# Patient Record
Sex: FEMALE | Race: ASIAN | NOT HISPANIC OR LATINO | Employment: UNEMPLOYED | URBAN - METROPOLITAN AREA
[De-identification: names, ages, dates, MRNs, and addresses within clinical notes are randomized per-mention and may not be internally consistent; named-entity substitution may affect disease eponyms.]

---

## 2017-06-19 ENCOUNTER — OFFICE VISIT (OUTPATIENT)
Dept: FAMILY MEDICINE | Facility: HOSPITAL | Age: 27
End: 2017-06-19
Payer: MEDICAID

## 2017-06-19 VITALS
HEIGHT: 65 IN | SYSTOLIC BLOOD PRESSURE: 98 MMHG | DIASTOLIC BLOOD PRESSURE: 63 MMHG | BODY MASS INDEX: 23.66 KG/M2 | HEART RATE: 75 BPM | WEIGHT: 142 LBS

## 2017-06-19 DIAGNOSIS — R53.83 FATIGUE, UNSPECIFIED TYPE: ICD-10-CM

## 2017-06-19 DIAGNOSIS — Z00.00 ANNUAL PHYSICAL EXAM: Primary | ICD-10-CM

## 2017-06-19 DIAGNOSIS — K74.00 LIVER FIBROSIS: ICD-10-CM

## 2017-06-19 DIAGNOSIS — K74.3 PRIMARY BILIARY CIRRHOSIS: ICD-10-CM

## 2017-06-19 DIAGNOSIS — J30.2 SEASONAL ALLERGIC RHINITIS, UNSPECIFIED ALLERGIC RHINITIS TRIGGER: ICD-10-CM

## 2017-06-19 DIAGNOSIS — L65.9 HAIR LOSS: ICD-10-CM

## 2017-06-19 PROCEDURE — 99203 OFFICE O/P NEW LOW 30 MIN: CPT | Performed by: FAMILY MEDICINE

## 2017-06-19 RX ORDER — NAPROXEN SODIUM 550 MG/1
TABLET ORAL
Refills: 3 | COMMUNITY
Start: 2017-04-14 | End: 2018-01-03

## 2017-06-19 RX ORDER — LORATADINE 10 MG/1
10 TABLET ORAL DAILY
Refills: 0 | COMMUNITY
Start: 2017-06-19 | End: 2018-04-24

## 2017-06-19 NOTE — PROGRESS NOTES
Subjective:       Patient ID: Karine Lancedwy is a 27 y.o. female.    Chief Complaint: Establish Care;  Extremity Weakness; and Dizziness    HPI   Patient is a 27 year old female with a history of grade 1 focal liver fibrosis found on biopsy, and +antimitochondrial antibodies and thrombocytopenia presenting to clinic to establish care and complains of weakness/fatigue, hair loss.  Patient states she is currently being followed by GI at [facility]-chart reviewed this visit.  No interventions at this time, continued monitoring of liver per imaging and lab recommended.  Patient reports for the past 2 years she has noticed increased lethargy and fatigue.  Also reports a 20 lb weight loss which was intentional and induced by dietary and exercise modifications.  She also states increased hair loss for the same amount of time.  Has a strong family history of breast cancer in her maternal aunt and mother (bilateral masectomy, chemo and hormonal therapy in mother as of now, maternal aunt passed of breast cancer mets to brain).    Review of Systems   Constitutional: Positive for fatigue. Negative for diaphoresis and fever.        Weakness   HENT: Negative for congestion.    Eyes: Negative for discharge and visual disturbance.   Respiratory: Negative for cough, shortness of breath and wheezing.    Cardiovascular: Negative for chest pain, palpitations and leg swelling.   Gastrointestinal: Negative for abdominal distention, abdominal pain, constipation, diarrhea, nausea and vomiting.   Endocrine: Negative for cold intolerance and heat intolerance.   Genitourinary: Negative for dysuria and flank pain.   Musculoskeletal: Negative for arthralgias and myalgias.   Skin: Negative for color change, pallor and rash.        Hair loss   Neurological: Negative for weakness, numbness and headaches.   Hematological: Negative for adenopathy.   Psychiatric/Behavioral: Negative for agitation, behavioral problems and hallucinations. The patient is  not nervous/anxious.          Objective:      Vitals:    06/19/17 1059   BP: 98/63   Pulse: 75     Physical Exam   Constitutional: She is oriented to person, place, and time. She appears well-developed and well-nourished.   HENT:   Head: Normocephalic and atraumatic.   Eyes: EOM are normal. Pupils are equal, round, and reactive to light.   Neck: Normal range of motion.   Cardiovascular: Normal rate, regular rhythm and normal heart sounds.  Exam reveals no gallop and no friction rub.    No murmur heard.  Pulmonary/Chest: Effort normal and breath sounds normal. No respiratory distress. She has no wheezes.   Abdominal: Soft. Bowel sounds are normal. She exhibits no distension. There is no tenderness.   Musculoskeletal: Normal range of motion. She exhibits no edema.   Neurological: She is alert and oriented to person, place, and time.   Psychiatric: She has a normal mood and affect. Her behavior is normal. Thought content normal.       Assessment:       1. Annual physical exam    2. Liver fibrosis    3. Primary biliary cirrhosis    4. Fatigue, unspecified type    5. Hair loss    6. Seasonal allergic rhinitis, unspecified allergic rhinitis trigger        Plan:       Annual physical exam  -     Comprehensive metabolic panel; Future; Expected date: 06/19/2017  -     Hemoglobin A1c; Future; Expected date: 06/19/2017    Liver fibrosis  Primary biliary cirrhosis        -    Followed by GI at [facility]        -    Counseled patient on hepatotoxic agents and foods-verbalized understanding.    Fatigue, unspecified type  -     CBC auto differential; Future; Expected date: 06/19/2017  -     TSH; Future; Expected date: 06/19/2017  -     Vitamin D; Future; Expected date: 06/19/2017    Hair loss  -     TSH; Future; Expected date: 06/19/2017    Seasonal allergic rhinitis, unspecified allergic rhinitis trigger  -     loratadine (CLARITIN) 10 mg tablet; Take 1 tablet (10 mg total) by mouth once daily.; Refill: 0    Health Maintenance and  screening       -    PAP up to date       -    Strong family history of breast cancer (see HPI).  Screening recommendations discussed with patient this visit.    Return in about 1 month (around 7/19/2017).      Patient discussed with staff.    Nina Hidalgo MD  [facility],  2  6/19/2017  1:11 PM

## 2017-06-20 DIAGNOSIS — E55.9 VITAMIN D DEFICIENCY: Primary | ICD-10-CM

## 2017-06-20 RX ORDER — VIT C/E/ZN/COPPR/LUTEIN/ZEAXAN 250MG-90MG
1000 CAPSULE ORAL DAILY
Refills: 0 | COMMUNITY
Start: 2017-06-20 | End: 2017-07-17 | Stop reason: SDUPTHER

## 2017-07-17 ENCOUNTER — OFFICE VISIT (OUTPATIENT)
Dept: FAMILY MEDICINE | Facility: HOSPITAL | Age: 27
End: 2017-07-17
Attending: FAMILY MEDICINE
Payer: MEDICAID

## 2017-07-17 VITALS
DIASTOLIC BLOOD PRESSURE: 56 MMHG | WEIGHT: 142.63 LBS | SYSTOLIC BLOOD PRESSURE: 92 MMHG | HEIGHT: 66 IN | BODY MASS INDEX: 22.92 KG/M2 | HEART RATE: 76 BPM

## 2017-07-17 DIAGNOSIS — E55.9 VITAMIN D DEFICIENCY: ICD-10-CM

## 2017-07-17 PROCEDURE — 99213 OFFICE O/P EST LOW 20 MIN: CPT | Performed by: FAMILY MEDICINE

## 2017-07-17 RX ORDER — FOLIC ACID 0.4 MG
400 TABLET ORAL DAILY
Refills: 0 | COMMUNITY
Start: 2017-07-17 | End: 2024-03-11

## 2017-07-17 RX ORDER — VIT C/E/ZN/COPPR/LUTEIN/ZEAXAN 250MG-90MG
1000 CAPSULE ORAL DAILY
Refills: 0 | COMMUNITY
Start: 2017-07-17 | End: 2018-04-24

## 2017-07-17 NOTE — PROGRESS NOTES
Subjective:       Patient ID: Karine Lancedwy is a 27 y.o. female.    Chief Complaint: Follow-up    HPI     27 year old female with possible primary sclerosing cholangitis came in for a wellness check and labs review, primary fibrosis of liver and +anti mitochondrial antibodies presenting to clinic for lab results and follow up.  Patient was noted to have low vitamin D and supplementation was sent to the pharmacy.  Patient stated she was unable to start supplements as of now.  She was also recently seen by GI at [facility].  Platelets noted downtown labs to be 103, our labs reflected 130 4 weeks ago.  Per GI monitor in on year, no interventions at this time.  Patient continues to complain of fatigue/feelings of tiredness.  Encouraged patient to start Vitamin D supplementation.    Review of Systems   Constitutional: Positive for fatigue. Negative for diaphoresis and fever.   HENT: Negative for congestion.    Eyes: Negative for discharge and visual disturbance.   Respiratory: Negative for cough, shortness of breath and wheezing.    Cardiovascular: Negative for chest pain, palpitations and leg swelling.   Gastrointestinal: Negative for abdominal distention, abdominal pain, constipation, diarrhea, nausea and vomiting.   Endocrine: Negative for cold intolerance and heat intolerance.   Genitourinary: Negative for dysuria and flank pain.   Musculoskeletal: Negative for arthralgias and myalgias.   Skin: Negative for color change, pallor and rash.   Neurological: Negative for weakness, numbness and headaches.   Hematological: Negative for adenopathy.   Psychiatric/Behavioral: Negative for agitation, behavioral problems and hallucinations. The patient is not nervous/anxious.        Objective:      Vitals:    07/17/17 1355   BP: (!) 92/56   Pulse: 76     Physical Exam   Constitutional: She is oriented to person, place, and time. She appears well-developed and well-nourished.   HENT:   Head: Normocephalic and atraumatic.   Eyes: EOM  are normal. Pupils are equal, round, and reactive to light.   Neck: Normal range of motion.   Cardiovascular: Normal rate, regular rhythm and normal heart sounds.  Exam reveals no gallop and no friction rub.    No murmur heard.  Pulmonary/Chest: Effort normal and breath sounds normal. No respiratory distress. She has no wheezes.   Abdominal: Soft. Bowel sounds are normal. She exhibits no distension. There is no tenderness.   Musculoskeletal: Normal range of motion. She exhibits no edema.   Neurological: She is alert and oriented to person, place, and time.   Psychiatric: She has a normal mood and affect. Her behavior is normal. Thought content normal.       Assessment:       1. Vitamin D deficiency        Plan:       Health Maintenace and Screening        -     PAP uptodate, Folic acid daily    History of liver fibrosis  Thrombocytopenia        -    Followed by A[facility]    Vitamin D deficiency  -     cholecalciferol, vitamin D3, 1,000 unit capsule; Take 1 capsule (1,000 Units total) by mouth once daily.; Refill: 0    Return in about 1 year (around 7/17/2018).      Patient discussed with staff.    Nina Hidalgo MD  [facility],  3  7/17/2017  2:46 PM

## 2017-07-18 NOTE — PROGRESS NOTES
I assume primary medical responsibility for this patient, I have reviewed the case history, findings, diagnosis and treatment plan with the resident and agree that the care is reasonable and necessary. This service has been performed by a resident without the presence of a teaching physician under the primary care exception  Patience Jaime  7/18/2017

## 2017-08-16 ENCOUNTER — OFFICE VISIT (OUTPATIENT)
Dept: FAMILY MEDICINE | Facility: HOSPITAL | Age: 27
End: 2017-08-16
Attending: FAMILY MEDICINE
Payer: MEDICAID

## 2017-08-16 VITALS
WEIGHT: 141.31 LBS | HEART RATE: 87 BPM | DIASTOLIC BLOOD PRESSURE: 54 MMHG | HEIGHT: 64 IN | SYSTOLIC BLOOD PRESSURE: 116 MMHG | BODY MASS INDEX: 24.13 KG/M2

## 2017-08-16 DIAGNOSIS — H00.12 CHALAZION OF RIGHT LOWER EYELID: ICD-10-CM

## 2017-08-16 PROCEDURE — 99213 OFFICE O/P EST LOW 20 MIN: CPT | Performed by: FAMILY MEDICINE

## 2017-08-16 NOTE — PROGRESS NOTES
Subjective:       Patient ID: Karine Lancedwy is a 27 y.o. female.    Chief Complaint: Eye Pain    Pt presents for checkup due to eye pain. The patient states for the past week she has had swelling in the lower eyelid of her right eye. She denies any fever, chills, redness, or drainage. Her father is an eye doctor in [place] who examined her last week and instructed her to perform warm compresses. The patient states since that time the swelling has improved but has concerns about what to do if the swelling returns. Otherwise the patient has been doing well and follows with GI at [facility].      Review of Systems   Constitutional: Negative for chills and fever.   HENT: Negative for sore throat.    Eyes: Negative for visual disturbance.        Minimal swelling right eye lower eyelid,   Respiratory: Negative for shortness of breath.    Cardiovascular: Negative for chest pain.   Gastrointestinal: Negative for abdominal pain.   Endocrine: Negative for polyuria.   Genitourinary: Negative for dysuria.   Musculoskeletal: Negative for back pain.   Skin: Negative for color change.   Neurological: Negative for headaches.   Psychiatric/Behavioral: Negative for agitation and confusion.       Objective:      Vitals:    08/16/17 1508   BP: (!) 116/54   Pulse: 87     Physical Exam   Constitutional: She is oriented to person, place, and time. She appears well-developed and well-nourished.   HENT:   Head: Normocephalic and atraumatic.   Eyes: EOM are normal. Pupils are equal, round, and reactive to light.   Minimal swelling right lower eyelid  Appearance of chalezeon, no concern for hordeolum   Neck: Normal range of motion. Neck supple.   Cardiovascular: Normal rate, regular rhythm, normal heart sounds and intact distal pulses.    Pulmonary/Chest: Effort normal and breath sounds normal.   Abdominal: Soft. She exhibits no distension.   Musculoskeletal: Normal range of motion.   Neurological: She is alert and oriented to person, place, and  time.   Skin: Skin is warm and dry.   Psychiatric: She has a normal mood and affect. Her behavior is normal. Judgment and thought content normal.   Nursing note and vitals reviewed.      Assessment:       1. Chalazion of right lower eyelid        Plan:       Chalazion of right lower eyelid  - continue warm compresses  - return to clinic swelling worsens and will consider optho eval for surgical removal  - also instructed return clinic if patient develops any pain, redness, drainage, or fever

## 2017-08-17 NOTE — PROGRESS NOTES
I assume primary medical responsibility for this patient, I have reviewed the case history, findings, diagnosis and treatment plan with the resident and agree that the care is reasonable and necessary. This service has been performed by a resident without the presence of a teaching physician under the primary care exception  Patience Jaime  8/17/2017

## 2017-09-13 ENCOUNTER — OFFICE VISIT (OUTPATIENT)
Dept: FAMILY MEDICINE | Facility: HOSPITAL | Age: 27
End: 2017-09-13
Attending: FAMILY MEDICINE
Payer: MEDICAID

## 2017-09-13 VITALS
HEIGHT: 64 IN | BODY MASS INDEX: 24.17 KG/M2 | WEIGHT: 141.56 LBS | DIASTOLIC BLOOD PRESSURE: 62 MMHG | HEART RATE: 81 BPM | SYSTOLIC BLOOD PRESSURE: 105 MMHG

## 2017-09-13 DIAGNOSIS — Z02.0 SCHOOL PHYSICAL EXAM: Primary | ICD-10-CM

## 2017-09-13 PROCEDURE — 99213 OFFICE O/P EST LOW 20 MIN: CPT | Performed by: FAMILY MEDICINE

## 2017-09-13 NOTE — PROGRESS NOTES
Subjective:       Patient ID: Karine TRISTAN Elsaadwmarcela is a 27 y.o. female.    Chief Complaint: Labs Only (titers) and TB test    HPI   Patient presenting to clinic for titers for school.  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  Will need PPD placement as well.  Has no active complaints at this present time.    Review of Systems   Constitutional: Negative for diaphoresis, fatigue and fever.   HENT: Negative for congestion.    Eyes: Negative for discharge and visual disturbance.   Respiratory: Negative for cough, shortness of breath and wheezing.    Cardiovascular: Negative for chest pain, palpitations and leg swelling.   Gastrointestinal: Negative for abdominal distention, abdominal pain, constipation, diarrhea, nausea and vomiting.   Endocrine: Negative for cold intolerance and heat intolerance.   Genitourinary: Negative for dysuria and flank pain.   Musculoskeletal: Negative for arthralgias and myalgias.   Skin: Negative for color change, pallor and rash.   Neurological: Negative for weakness, numbness and headaches.   Hematological: Negative for adenopathy.   Psychiatric/Behavioral: Negative for agitation, behavioral problems and hallucinations. The patient is not nervous/anxious.          Objective:      Vitals:    09/13/17 1459   BP: 105/62   Pulse: 81     Physical Exam   Constitutional: She is oriented to person, place, and time. She appears well-developed and well-nourished.   HENT:   Head: Normocephalic and atraumatic.   Eyes: EOM are normal. Pupils are equal, round, and reactive to light.   Neck: Normal range of motion.   Cardiovascular: Normal rate, regular rhythm and normal heart sounds.  Exam reveals no gallop and no friction rub.    Pulmonary/Chest: Effort normal and breath sounds normal. No respiratory distress. She has no wheezes.   Abdominal: Soft. Bowel sounds are normal. She exhibits no distension. There is no tenderness.   Musculoskeletal: Normal range of motion. She exhibits  no edema.   Neurological: She is alert and oriented to person, place, and time.   Psychiatric: She has a normal mood and affect. Her behavior is normal. Thought content normal.       Assessment:       1. School physical exam        Plan:       School physical exam  -     tuberculin injection 5 Units; Inject 0.1 mLs (5 Units total) into the skin one time.  -     Rubella antibody, IgG; Future; Expected date: 09/13/2017  -     Rubeola antibody IgG; Future; Expected date: 09/13/2017  -     Varicella zoster antibody, IgG; Future; Expected date: 09/13/2017  -     MUMPS ANTIBODY, IGG; Future; Expected date: 09/13/2017    Return in about 2 days (around 9/15/2017) to have PPD read.    Patient discussed with staff.    Nina Hidalgo MD  [facility],  3  9/13/2017  5:37 PM

## 2017-09-13 NOTE — PROGRESS NOTES
PPD applied intradermally to left inner forearm. Patient instructed to return Friday afternoon at 4:15 to have PPD read. Verbalized understanding.

## 2017-09-14 NOTE — PROGRESS NOTES
I assume primary medical responsibility for this patient, I have reviewed the case history, findings, diagnosis and treatment plan with the resident and agree that the care is reasonable and necessary. This service has been performed by a resident without the presence of a teaching physician under the primary care exception  Patience Jaime  9/14/2017

## 2018-01-03 ENCOUNTER — OFFICE VISIT (OUTPATIENT)
Dept: FAMILY MEDICINE | Facility: HOSPITAL | Age: 28
End: 2018-01-03
Attending: FAMILY MEDICINE
Payer: MEDICAID

## 2018-01-03 VITALS
DIASTOLIC BLOOD PRESSURE: 66 MMHG | HEART RATE: 105 BPM | HEIGHT: 66 IN | SYSTOLIC BLOOD PRESSURE: 124 MMHG | WEIGHT: 148.13 LBS | BODY MASS INDEX: 23.81 KG/M2

## 2018-01-03 DIAGNOSIS — J32.9 SINUSITIS, UNSPECIFIED CHRONICITY, UNSPECIFIED LOCATION: Primary | ICD-10-CM

## 2018-01-03 DIAGNOSIS — N92.0 MENORRHAGIA WITH REGULAR CYCLE: ICD-10-CM

## 2018-01-03 PROCEDURE — 99213 OFFICE O/P EST LOW 20 MIN: CPT | Performed by: FAMILY MEDICINE

## 2018-01-03 RX ORDER — FLUTICASONE PROPIONATE 50 MCG
1 SPRAY, SUSPENSION (ML) NASAL DAILY
Qty: 9.9 BOTTLE | Refills: 1 | Status: SHIPPED | OUTPATIENT
Start: 2018-01-03 | End: 2024-02-21

## 2018-01-03 RX ORDER — NAPROXEN 500 MG/1
500 TABLET ORAL 2 TIMES DAILY
Qty: 30 TABLET | Refills: 0 | Status: SHIPPED | OUTPATIENT
Start: 2018-01-03 | End: 2018-04-24 | Stop reason: SDUPTHER

## 2018-01-03 NOTE — PROGRESS NOTES
Subjective:       Patient ID: Karine Hernandez is a 27 y.o. female.    Chief Complaint: Headache    HPI   Patient is a 27 year old female with a history of PBC presenting to clinic headache that has been intermittent since Thanksgiving.  Reports generalized headache with occasional dizziness upon bending forward.  Has taken OTC ibuprofen with minimal relief of headaches.  Denies any vision changes, aura, or alleviating factors.  Denies any nausea/vomiting.     Of note patient also would like to discuss possible removal of Paraguard.  Placed in October of 2016.  States she has a period every 28 days which is heavy and painful. Lasts 7-10 days.  Menses became painful and heavy since having IUD placed.  Currently interested in conceiving with her .     Review of Systems   Constitutional: Negative for diaphoresis, fatigue and fever.   HENT: Negative for congestion.    Eyes: Negative for visual disturbance.   Respiratory: Negative for cough, shortness of breath and wheezing.    Gastrointestinal: Negative for nausea and vomiting.        +menstrual cramping     Endocrine: Negative for cold intolerance and heat intolerance.   Genitourinary: Negative for dysuria and flank pain.        Menstrual problem   Musculoskeletal: Negative for arthralgias.   Skin: Negative for color change, pallor and rash.   Neurological: Positive for headaches. Negative for weakness and numbness.   Hematological: Negative for adenopathy.         Objective:      Vitals:    01/03/18 1609   BP: 124/66   Pulse: 105     Physical Exam   Constitutional: She is oriented to person, place, and time. She appears well-developed and well-nourished.   HENT:   Head: Normocephalic and atraumatic.   Eyes: EOM are normal.   Neck: Normal range of motion.   Cardiovascular: Normal rate and regular rhythm.    Pulmonary/Chest: Effort normal and breath sounds normal.   Abdominal: Soft. Bowel sounds are normal. She exhibits no distension. There is no tenderness.    Musculoskeletal: Normal range of motion. She exhibits no edema.   Neurological: She is alert and oriented to person, place, and time.       Assessment:       1. Sinusitis, unspecified chronicity, unspecified location    2. Menorrhagia with regular cycle        Plan:       Sinusitis, unspecified chronicity, unspecified location  -     fluticasone (FLONASE) 50 mcg/actuation nasal spray; 1 spray by Each Nare route once daily.  Dispense: 9.9 Bottle; Refill: 1    Menorrhagia with regular cycle  -     naproxen (EC NAPROSYN) 500 MG EC tablet; Take 1 tablet (500 mg total) by mouth 2 (two) times daily.  Dispense: 30 tablet; Refill: 0  -    Currently has Paraguard in place, interested in possible removal for conception.    Return in about 1 week (around 1/10/2018) for IUD removal.      Nina Hidalgo MD  [facility],  3  1/3/2018  4:40 PM

## 2018-01-04 NOTE — PROGRESS NOTES
I assume primary medical responsibility for this patient, I have reviewed the case history, findings, diagnosis and treatment plan with the resident and agree that the care is reasonable and necessary. This service has been performed by a resident without the presence of a teaching physician under the primary care exception  Patience Jaime  1/4/2018

## 2018-01-16 ENCOUNTER — OFFICE VISIT (OUTPATIENT)
Dept: FAMILY MEDICINE | Facility: HOSPITAL | Age: 28
End: 2018-01-16
Attending: FAMILY MEDICINE
Payer: MEDICAID

## 2018-01-16 ENCOUNTER — HOSPITAL ENCOUNTER (OUTPATIENT)
Dept: RADIOLOGY | Facility: HOSPITAL | Age: 28
Discharge: HOME | End: 2018-01-16
Attending: STUDENT IN AN ORGANIZED HEALTH CARE EDUCATION/TRAINING PROGRAM
Payer: MEDICAID

## 2018-01-16 VITALS
HEIGHT: 65 IN | SYSTOLIC BLOOD PRESSURE: 117 MMHG | HEART RATE: 77 BPM | WEIGHT: 147.5 LBS | DIASTOLIC BLOOD PRESSURE: 57 MMHG | BODY MASS INDEX: 24.57 KG/M2 | RESPIRATION RATE: 20 BRPM

## 2018-01-16 DIAGNOSIS — S92.532A CLOSED DISPLACED FRACTURE OF DISTAL PHALANX OF LESSER TOE OF LEFT FOOT, INITIAL ENCOUNTER: Primary | ICD-10-CM

## 2018-01-16 DIAGNOSIS — S92.505A CLOSED NONDISPLACED FRACTURE OF PHALANX OF LESSER TOE OF LEFT FOOT, UNSPECIFIED PHALANX, INITIAL ENCOUNTER: ICD-10-CM

## 2018-01-16 PROCEDURE — 99214 OFFICE O/P EST MOD 30 MIN: CPT | Mod: 25 | Performed by: STUDENT IN AN ORGANIZED HEALTH CARE EDUCATION/TRAINING PROGRAM

## 2018-01-16 PROCEDURE — 73660 X-RAY EXAM OF TOE(S): CPT | Mod: 26,LT,, | Performed by: RADIOLOGY

## 2018-01-16 PROCEDURE — 73660 X-RAY EXAM OF TOE(S): CPT | Mod: TC,FY

## 2018-01-17 NOTE — PROGRESS NOTES
Subjective:       Patient ID: Karine Hernandez is a 27 y.o. female.    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 It started around black Friday, she dropped something heavy on it and the other toes recovered but the 4th toe is worsening. She takes 2~3 advil to ease the pain. She also tried jeanie taped without significant help.      Review of Systems   Constitutional: Negative for activity change, appetite change, chills, diaphoresis, fatigue and fever.   HENT: Negative for congestion, hearing loss, sinus pain, sinus pressure and sneezing.    Eyes: Negative for visual disturbance.   Respiratory: Negative for apnea, cough, chest tightness, shortness of breath and wheezing.    Cardiovascular: Negative for chest pain, palpitations and leg swelling.   Gastrointestinal: Negative for abdominal distention, abdominal pain, anal bleeding, blood in stool, constipation, diarrhea, nausea and vomiting.   Endocrine: Negative for polyuria.   Genitourinary: Negative for difficulty urinating, dysuria and hematuria.   Musculoskeletal: Positive for joint swelling and myalgias.       Objective:      Vitals:    01/16/18 1401   BP: (!) 117/57   Pulse: 77   Resp: 20     Physical Exam   Constitutional: She is oriented to person, place, and time. She appears well-developed and well-nourished.   HENT:   Head: Normocephalic and atraumatic.   Nose: Nose normal.   Eyes: Conjunctivae and EOM are normal. Right eye exhibits no discharge. Left eye exhibits no discharge. No scleral icterus.   Neck: Normal range of motion. Neck supple. No JVD present.   Cardiovascular: Normal rate, regular rhythm, normal heart sounds and intact distal pulses.  Exam reveals no gallop and no friction rub.    No murmur heard.  Pulmonary/Chest: Effort normal and breath sounds normal. No  respiratory distress. She has no wheezes. She has no rales. She exhibits no tenderness.   Abdominal: Soft. Bowel sounds are normal. She exhibits no distension and no mass. There is no tenderness. There is no rebound and no guarding.   Musculoskeletal: Normal range of motion. She exhibits no edema, tenderness or deformity.   Neurological: She is alert and oriented to person, place, and time.   Skin: Skin is warm. No rash noted. She is not diaphoretic. No erythema. No pallor.   Psychiatric: She has a normal mood and affect. Her behavior is normal. Judgment and thought content normal.   Nursing note and vitals reviewed.      Assessment:       1. Closed displaced fracture of distal phalanx of lesser toe of left foot, initial encounter        Plan:       Closed displaced fracture of distal phalanx of lesser toe of left foot, initial encounter  -     X-Ray Toe 2 or More Views Left; Future; Expected date: 01/16/2018  -     Ambulatory referral to Podiatry    called patient about the closed oblique 2mm displaced fracture in her left 4th toe in xray. Sent amb referal to Dr. Hamm and called the patient for the result and let her know about the referral already. Patient verbalized appreciation.     Follow-up in about 4 weeks (around 2/13/2018) for wellness and toe fracture f/u.

## 2018-01-23 NOTE — PROGRESS NOTES
I have reviewed the notes, assessments, and/or procedures performed by Dr. Coley, I concur with her/his documentation of Karine TRISTAN Elsaadwy.

## 2018-03-20 ENCOUNTER — OFFICE VISIT (OUTPATIENT)
Dept: FAMILY MEDICINE | Facility: HOSPITAL | Age: 28
End: 2018-03-20
Attending: FAMILY MEDICINE
Payer: MEDICAID

## 2018-03-20 VITALS
SYSTOLIC BLOOD PRESSURE: 104 MMHG | WEIGHT: 147.94 LBS | HEIGHT: 65 IN | DIASTOLIC BLOOD PRESSURE: 60 MMHG | BODY MASS INDEX: 24.65 KG/M2 | HEART RATE: 68 BPM

## 2018-03-20 DIAGNOSIS — G43.109 MIGRAINE WITH AURA AND WITHOUT STATUS MIGRAINOSUS, NOT INTRACTABLE: Primary | ICD-10-CM

## 2018-03-20 PROCEDURE — 99213 OFFICE O/P EST LOW 20 MIN: CPT | Performed by: FAMILY MEDICINE

## 2018-03-20 RX ORDER — PROPRANOLOL HYDROCHLORIDE 10 MG/1
10 TABLET ORAL 2 TIMES DAILY
Qty: 90 TABLET | Refills: 0 | Status: SHIPPED | OUTPATIENT
Start: 2018-03-20 | End: 2018-04-24

## 2018-03-20 RX ORDER — PROPRANOLOL HYDROCHLORIDE 10 MG/1
10 TABLET ORAL 3 TIMES DAILY
Qty: 90 TABLET | Refills: 0 | Status: SHIPPED | OUTPATIENT
Start: 2018-03-20 | End: 2018-03-20 | Stop reason: SDUPTHER

## 2018-03-20 RX ORDER — SUMATRIPTAN SUCCINATE 25 MG/1
25 TABLET ORAL EVERY 6 HOURS PRN
Qty: 15 TABLET | Refills: 0 | Status: SHIPPED | OUTPATIENT
Start: 2018-03-20 | End: 2024-02-21

## 2018-03-20 NOTE — PROGRESS NOTES
"Subjective:       Patient ID: Karine Lancedwmarcela is a 27 y.o. female.    Chief Complaint: Follow-up    HPI   Patient is a 27 year old female presenting to clinic for headaches.  Patient reports persistent headaches for the past several months.  States she has been drinking coffee and Monster energy drinks to help her have energy to study and get through finals. Reports seeing "floaters" prior to having headache.  Described as throbbing bilateral frontal lobes.  Has been taking OTC Ibuprofen with some symptomatic pain relief.     Patient also requesting stimulants to help her study for final exams. No current diagnosis of ADHD.    Recently had IUD removed at [facility], attempting to conceive with  and requesting weight loss supplements prior to getting pregnant.  States she is unhappy with her weight and would like to get into shape prior to conceiving.  Reports dietary changes and exercising weekly.      Review of Systems   Constitutional: Negative for diaphoresis, fatigue and fever.   HENT: Negative for congestion.    Eyes: Negative for discharge and visual disturbance.   Respiratory: Negative for cough, shortness of breath and wheezing.    Cardiovascular: Negative for chest pain, palpitations and leg swelling.   Gastrointestinal: Negative for abdominal distention, abdominal pain, constipation, diarrhea, nausea and vomiting.   Endocrine: Negative for cold intolerance and heat intolerance.   Genitourinary: Negative for dysuria and flank pain.   Musculoskeletal: Negative for arthralgias and myalgias.   Skin: Negative for color change, pallor and rash.   Neurological: Positive for headaches. Negative for weakness and numbness.   Hematological: Negative for adenopathy.   Psychiatric/Behavioral: Negative for agitation, behavioral problems and hallucinations. The patient is not nervous/anxious.          Objective:      Vitals:    03/20/18 1515   BP: 104/60   Pulse: 68     Physical Exam   Constitutional: She is oriented " to person, place, and time. She appears well-developed and well-nourished.   HENT:   Head: Normocephalic and atraumatic.   Eyes: EOM are normal. Pupils are equal, round, and reactive to light.   Neck: Normal range of motion.   Cardiovascular: Normal rate and regular rhythm.    Pulmonary/Chest: Effort normal and breath sounds normal.   Abdominal: Soft. Bowel sounds are normal. She exhibits no distension. There is no tenderness.   Musculoskeletal: Normal range of motion. She exhibits no edema.   Neurological: She is alert and oriented to person, place, and time.   Psychiatric: She has a normal mood and affect. Her behavior is normal. Thought content normal.       Assessment:       1. Migraine with aura and without status migrainosus, not intractable    2. BMI 24.0-24.9, adult        Plan:       Migraine with aura and without status migrainosus, not intractable  -     sumatriptan (IMITREX) 25 MG Tab; Take 1 tablet (25 mg total) by mouth every 6 (six) hours as needed.  Dispense: 15 tablet; Refill: 0  -     propranolol (INDERAL) 10 MG tablet; Take 1 tablet (10 mg total) by mouth 2 (two) times daily.  Dispense: 90 tablet; Refill: 0    BMI 24.62        -    Current weight 147 lbs, BMI 24.62        -    Patient interested in weight loss supplements prior to conceiving.  Recently had IUD removed at [facility].        -    Lifestyle modifications encouraged.    Follow-up in about 6 months (around 9/20/2018).      Nina Hidalgo MD  [facility],  3  3/20/2018  3:48 PM

## 2018-04-24 ENCOUNTER — HOSPITAL ENCOUNTER (OUTPATIENT)
Dept: RADIOLOGY | Facility: HOSPITAL | Age: 28
Discharge: HOME | End: 2018-04-24
Attending: FAMILY MEDICINE
Payer: MEDICAID

## 2018-04-24 ENCOUNTER — OFFICE VISIT (OUTPATIENT)
Dept: FAMILY MEDICINE | Facility: HOSPITAL | Age: 28
End: 2018-04-24
Attending: FAMILY MEDICINE
Payer: MEDICAID

## 2018-04-24 VITALS
SYSTOLIC BLOOD PRESSURE: 103 MMHG | HEIGHT: 64 IN | WEIGHT: 152.75 LBS | HEART RATE: 72 BPM | BODY MASS INDEX: 26.08 KG/M2 | DIASTOLIC BLOOD PRESSURE: 63 MMHG

## 2018-04-24 DIAGNOSIS — N92.0 MENORRHAGIA WITH REGULAR CYCLE: ICD-10-CM

## 2018-04-24 DIAGNOSIS — M25.472 LEFT ANKLE SWELLING: Primary | ICD-10-CM

## 2018-04-24 DIAGNOSIS — M25.472 LEFT ANKLE SWELLING: ICD-10-CM

## 2018-04-24 PROCEDURE — 73610 X-RAY EXAM OF ANKLE: CPT | Mod: TC,FY,LT

## 2018-04-24 PROCEDURE — 99213 OFFICE O/P EST LOW 20 MIN: CPT | Mod: 25 | Performed by: FAMILY MEDICINE

## 2018-04-24 PROCEDURE — 73610 X-RAY EXAM OF ANKLE: CPT | Mod: 26,LT,, | Performed by: RADIOLOGY

## 2018-04-24 RX ORDER — NAPROXEN 500 MG/1
500 TABLET ORAL 2 TIMES DAILY
Qty: 30 TABLET | Refills: 0 | Status: SHIPPED | OUTPATIENT
Start: 2018-04-24 | End: 2024-02-21

## 2018-04-24 RX ORDER — IBUPROFEN 800 MG/1
TABLET ORAL
Refills: 0 | COMMUNITY
Start: 2018-04-21 | End: 2018-04-24 | Stop reason: CLARIF

## 2018-04-24 RX ORDER — PREDNISONE 20 MG/1
TABLET ORAL
Refills: 0 | COMMUNITY
Start: 2018-04-23 | End: 2024-03-11

## 2018-04-24 NOTE — PROGRESS NOTES
I have reviewed the notes, assessments, and/or procedures performed by Dr. Hidalgo, I concur with her/his documentation of Karine Hernandez.

## 2018-04-24 NOTE — PROGRESS NOTES
Subjective:       Patient ID: Karine Hernandez is a 27 y.o. female.    Chief Complaint: Foot Swelling    Karine Hernandez 27 y.o. Female presenting to clinic with a chief complaint of left ankle swelling/pain. The patient states the pain/swelling began Sunday, but worsened severely yesterday morning. The swelling was present when she awoke from sleep. She states the pain was 10/10 yesterday but has mproved today. The pain is worsened by walking, and has not been relieved by advil. She was seen in urgent care yesterday and given a Toradol injection and a course of prednisone.  She denies trauma, twisting of ankle, and dropping anything on the foot to cause the symptoms. She reports she did nothing all weekend that could have caused the symptoms. She denies drinking, and consuming large amounts of shellfish/red meat. Over the weekend she reports eating a steak Friday night and lasagna on Saturday.  No family history of Gout. She reports her mother has RA.   Patient also endorses similar ankle pain about 2 months ago that subsided on its own.      Review of Systems   Constitutional: Negative for chills and fever.   HENT: Negative for sore throat.    Eyes: Negative for visual disturbance.   Respiratory: Negative for cough, shortness of breath and wheezing.    Cardiovascular: Negative for chest pain, palpitations and leg swelling.   Gastrointestinal: Negative for abdominal pain, constipation, diarrhea, nausea and vomiting.   Genitourinary: Negative for difficulty urinating.   Musculoskeletal: Positive for arthralgias (left ankle ) and joint swelling (left ankle). Negative for myalgias.   Neurological: Negative for dizziness and seizures.   Psychiatric/Behavioral: The patient is not nervous/anxious.        Objective:      Vitals:    04/24/18 1516   BP: 103/63   Pulse: 72     Physical Exam   Constitutional: She is oriented to person, place, and time. She appears well-developed and well-nourished.   HENT:   Head:  Normocephalic and atraumatic.   Eyes: EOM are normal. Pupils are equal, round, and reactive to light.   Neck: Normal range of motion.   Cardiovascular: Normal rate and regular rhythm.    Pulmonary/Chest: Effort normal and breath sounds normal.   Abdominal: Soft. Bowel sounds are normal. She exhibits no distension. There is no tenderness.   Musculoskeletal: Normal range of motion. She exhibits edema (trace edema left ankle). She exhibits no tenderness (non-tender left ankle).   Neurological: She is alert and oriented to person, place, and time.   Psychiatric: She has a normal mood and affect. Her behavior is normal. Thought content normal.       Assessment:       1. Left ankle swelling    2. Menorrhagia with regular cycle        Plan:       Left ankle swelling  -     X-Ray Ankle Complete Left; Future; Expected date: 04/24/2018  -     Comprehensive metabolic panel; Future; Expected date: 04/24/2018  -     Uric acid; Future; Expected date: 04/24/2018    Menorrhagia with regular cycle  -     naproxen (EC NAPROSYN) 500 MG EC tablet; Take 1 tablet (500 mg total) by mouth 2 (two) times daily.  Dispense: 30 tablet; Refill: 0      Follow-up if symptoms worsen or fail to improve.      Nina Hidalgo MD  [facility],  3  4/24/2018  5:40 PM

## 2020-07-09 ENCOUNTER — LAB VISIT (OUTPATIENT)
Dept: LAB | Facility: OTHER | Age: 30
End: 2020-07-09
Payer: MEDICAID

## 2020-07-09 ENCOUNTER — INITIAL CONSULT (OUTPATIENT)
Dept: UROGYNECOLOGY | Facility: CLINIC | Age: 30
End: 2020-07-09
Payer: MEDICAID

## 2020-07-09 VITALS
BODY MASS INDEX: 24.42 KG/M2 | DIASTOLIC BLOOD PRESSURE: 62 MMHG | WEIGHT: 143.06 LBS | HEIGHT: 64 IN | SYSTOLIC BLOOD PRESSURE: 94 MMHG

## 2020-07-09 DIAGNOSIS — N93.9 ABNORMAL UTERINE BLEEDING (AUB): ICD-10-CM

## 2020-07-09 DIAGNOSIS — N93.9 ABNORMAL UTERINE BLEEDING (AUB): Primary | ICD-10-CM

## 2020-07-09 DIAGNOSIS — R10.2 PELVIC PRESSURE IN FEMALE: ICD-10-CM

## 2020-07-09 LAB
BASOPHILS # BLD AUTO: 0.07 K/UL (ref 0–0.2)
BASOPHILS NFR BLD: 1 % (ref 0–1.9)
DIFFERENTIAL METHOD: ABNORMAL
EOSINOPHIL # BLD AUTO: 0.2 K/UL (ref 0–0.5)
EOSINOPHIL NFR BLD: 2.1 % (ref 0–8)
ERYTHROCYTE [DISTWIDTH] IN BLOOD BY AUTOMATED COUNT: 12.3 % (ref 11.5–14.5)
HCT VFR BLD AUTO: 39.4 % (ref 37–48.5)
HGB BLD-MCNC: 13.4 G/DL (ref 12–16)
IMM GRANULOCYTES # BLD AUTO: 0.02 K/UL (ref 0–0.04)
IMM GRANULOCYTES NFR BLD AUTO: 0.3 % (ref 0–0.5)
LYMPHOCYTES # BLD AUTO: 1.4 K/UL (ref 1–4.8)
LYMPHOCYTES NFR BLD: 18.8 % (ref 18–48)
MCH RBC QN AUTO: 29.5 PG (ref 27–31)
MCHC RBC AUTO-ENTMCNC: 34 G/DL (ref 32–36)
MCV RBC AUTO: 87 FL (ref 82–98)
MONOCYTES # BLD AUTO: 0.5 K/UL (ref 0.3–1)
MONOCYTES NFR BLD: 6.2 % (ref 4–15)
NEUTROPHILS # BLD AUTO: 5.2 K/UL (ref 1.8–7.7)
NEUTROPHILS NFR BLD: 71.6 % (ref 38–73)
NRBC BLD-RTO: 0 /100 WBC
PLATELET # BLD AUTO: 116 K/UL (ref 150–350)
PMV BLD AUTO: 12.7 FL (ref 9.2–12.9)
RBC # BLD AUTO: 4.55 M/UL (ref 4–5.4)
WBC # BLD AUTO: 7.29 K/UL (ref 3.9–12.7)

## 2020-07-09 PROCEDURE — 87086 URINE CULTURE/COLONY COUNT: CPT

## 2020-07-09 PROCEDURE — 99999 PR PBB SHADOW E&M-EST. PATIENT-LVL III: ICD-10-PCS | Mod: PBBFAC,,, | Performed by: OBSTETRICS & GYNECOLOGY

## 2020-07-09 PROCEDURE — 99205 PR OFFICE/OUTPT VISIT, NEW, LEVL V, 60-74 MIN: ICD-10-PCS | Mod: S$PBB,,, | Performed by: OBSTETRICS & GYNECOLOGY

## 2020-07-09 PROCEDURE — 99213 OFFICE O/P EST LOW 20 MIN: CPT | Mod: PBBFAC | Performed by: OBSTETRICS & GYNECOLOGY

## 2020-07-09 PROCEDURE — 36415 COLL VENOUS BLD VENIPUNCTURE: CPT

## 2020-07-09 PROCEDURE — 85025 COMPLETE CBC W/AUTO DIFF WBC: CPT

## 2020-07-09 PROCEDURE — 99205 OFFICE O/P NEW HI 60 MIN: CPT | Mod: S$PBB,,, | Performed by: OBSTETRICS & GYNECOLOGY

## 2020-07-09 PROCEDURE — 99999 PR PBB SHADOW E&M-EST. PATIENT-LVL III: CPT | Mod: PBBFAC,,, | Performed by: OBSTETRICS & GYNECOLOGY

## 2020-07-09 RX ORDER — HYDROXYCHLOROQUINE SULFATE 200 MG/1
TABLET, FILM COATED ORAL
COMMUNITY
Start: 2020-04-28 | End: 2024-02-21

## 2020-07-09 RX ORDER — LOSARTAN POTASSIUM 25 MG/1
25 TABLET ORAL NIGHTLY
COMMUNITY

## 2020-07-09 NOTE — PROGRESS NOTES
Subjective:       Patient ID: Karine Lancedwmarcela is a 30 y.o. female.    Chief Complaint:  abnormal bleeding and Painful [place]      History of Present Illness  HPI 30 Y O F  has no past medical history on file. Lupus, hepatic fibrosis- ( biopsy proven)   has a history of AUB since the placement of the iud, 14 months after the birth of her first child. She has also painful intercourse. Vaginal itching at times no associated discharge. The lupus was diagnosed in 2018- she see's Rheumatology at [facility]. she also see's  Nephology ( Dr. John) - s/p biopsy. Now stable         GYN & OB History  No LMP recorded.   Date of Last Pap: No result found    OB History    Para Term  AB Living   1 1 1         SAB TAB Ectopic Multiple Live Births                  # Outcome Date GA Lbr Cyril/2nd Weight Sex Delivery Anes PTL Lv   1 Term              OB     x  C/s x 1 - Fetal n  Largest: 7 # 12 oz   Forceps: no  Episiotomy:  no  Degree: 3rd or 4th no    GYN  Menarche:  13  Menstrual cycle:  /moderate flow/   Menopause:   Hysterectomy:    Ovaries present   Tubal ligation: NO   Other abdominal surgeries:     Review of Systems  Review of Systems   Constitutional: Negative.  Negative for activity change, appetite change, chills, diaphoresis, fatigue, fever and unexpected weight change.   HENT: Negative.    Eyes: Negative.    Respiratory: Negative.  Negative for apnea, cough and wheezing.    Cardiovascular: Negative.  Negative for chest pain and palpitations.   Gastrointestinal: Negative for abdominal distention, abdominal pain, anal bleeding, blood in stool, constipation, diarrhea, nausea, rectal pain and vomiting.   Endocrine: Negative.    Genitourinary: Positive for dyspareunia and menstrual problem. Negative for decreased urine volume, difficulty urinating, dysuria, enuresis, flank pain, frequency, genital sores, hematuria, pelvic pain, urgency, vaginal bleeding, vaginal discharge and vaginal pain.    Musculoskeletal: Negative for back pain and gait problem.   Skin: Negative for color change, pallor, rash and wound.   Allergic/Immunologic: Negative for immunocompromised state.   Neurological: Negative.  Negative for dizziness and speech difficulty.   Hematological: Negative for adenopathy.   Psychiatric/Behavioral: Negative for agitation, behavioral problems, confusion and sleep disturbance.           Objective:     Physical Exam   Constitutional: She is oriented to person, place, and time. She appears well-developed.   HENT:   Head: Normocephalic and atraumatic.   Eyes: Conjunctivae and EOM are normal.   Neck: Normal range of motion. Neck supple.   Cardiovascular: Normal rate, regular rhythm, S1 normal, S2 normal, normal heart sounds and intact distal pulses.   Pulmonary/Chest: Effort normal and breath sounds normal. She exhibits no tenderness.   Abdominal: Soft. Bowel sounds are normal. She exhibits no distension and no mass. There is no splenomegaly or hepatomegaly. There is no abdominal tenderness. There is no rigidity, no rebound and no guarding. No hernia.   Genitourinary: Pelvic exam was performed with patient supine. Rectum normal, vagina normal, skenes normal and bartholins normal. Right labia normal and left labia normal. Urethra exhibits hypermobility. Urethra exhibits no urethral caruncle, no urethral diverticulum and no urethral mass. Right bartholin is not enlarged and not tender. Left bartholin is not enlarged and not tender. Rectal exam shows resting tone normal and active tone normal. Rectal exam shows no external hemorrhoid, no fissure, no tenderness, anal tone normal and no dovetailing. Guaiac negative stool. No foreign body, tenderness, bleeding, unspecified prolapse of vaginal walls, fistula, mesh exposure or lavator tenderness in the vagina. Right adnexum displays no mass and no tenderness. Left adnexum displays no mass and no tenderness. Cervix exhibits motion tenderness. Cervix exhibits  no discharge. Additional cervical findings: IUD strings visualizedUterus is tender. Uterus is not experiencing uterine prolapse.   PVR: 90 ML  Empty cough stress test: Negative.  Kegel: 3/5    POP-Q  Aa: -2 Ba: -2 C: -5   GH: 3 PB: 2 TVL: 9   Ap: -2 Bp: -2 D: -6                     Musculoskeletal: Normal range of motion.   Neurological: She is alert and oriented to person, place, and time. She has normal strength and normal reflexes. Cranial nerves II through XII intact. No cranial nerve deficit.   Skin: Skin is warm and dry.   Psychiatric: She has a normal mood and affect. Her speech is normal and behavior is normal. Judgment normal.             HCM  Pap's: Normal  last Pap:  2018  Mammo: n/a  Colonoscopy: N/a: normal   Dexa:  n/a        Assessment:        1. Abnormal uterine bleeding (AUB)    2. Pelvic pressure in female             Plan:      1. AUB  -- Recommend TVS: to see positioning of the iud,       2. Dyspareunia:  --Long discussion about possible causes for the pain, suspect  levator ani origin, without evidence of high tone pelvic floor dysfunction  --Start Pelvic floor therapy   -- consider  10 mg Elavil daily, may need to increase in the future, SE profile discussed   --Keep pelvic pain diary, detail when the pain is felt and where the pain extends to  --Discussed liberal use of lubrication and digitation prior to attempted penetration     Approximately  50 min were spent in consult, 90 % in discussion.     Thank you for requesting consultation of your patient.  I look forward to participating in her care.    Coy Sutherland DO  Female Pelvic Medicine and Reconstructive Surgery  [facility] [place], [place]

## 2020-07-09 NOTE — PATIENT INSTRUCTIONS
1. AUB  -- Recommend TVS: to see positioning of the iud,       2. Dyspareunia:  --Long discussion about possible causes for the pain, suspect  levator ani origin   --Start Pelvic floor therapy   -- consider  10 mg Elavil daily, may need to increase in the future, SE profile discussed   --Keep pelvic pain diary, detail when the pain is felt and where the pain extends to  --Discussed liberal use of lubrication and digitation prior to attempted penetration

## 2020-07-10 LAB — BACTERIA UR CULT: NO GROWTH

## 2020-07-14 ENCOUNTER — HOSPITAL ENCOUNTER (OUTPATIENT)
Dept: RADIOLOGY | Facility: HOSPITAL | Age: 30
Discharge: HOME | End: 2020-07-14
Attending: OBSTETRICS & GYNECOLOGY
Payer: MEDICAID

## 2020-07-14 ENCOUNTER — PATIENT MESSAGE (OUTPATIENT)
Dept: UROGYNECOLOGY | Facility: CLINIC | Age: 30
End: 2020-07-14

## 2020-07-14 DIAGNOSIS — N93.9 ABNORMAL UTERINE BLEEDING (AUB): ICD-10-CM

## 2020-07-14 PROCEDURE — 76830 TRANSVAGINAL US NON-OB: CPT | Mod: TC

## 2020-07-14 PROCEDURE — 76830 US PELVIS COMP WITH TRANSVAG NON-OB (XPD): ICD-10-PCS | Mod: 26,,, | Performed by: RADIOLOGY

## 2020-07-14 PROCEDURE — 76856 US PELVIS COMP WITH TRANSVAG NON-OB (XPD): ICD-10-PCS | Mod: 26,,, | Performed by: RADIOLOGY

## 2020-07-14 PROCEDURE — 76856 US EXAM PELVIC COMPLETE: CPT | Mod: 26,,, | Performed by: RADIOLOGY

## 2020-07-14 PROCEDURE — 76830 TRANSVAGINAL US NON-OB: CPT | Mod: 26,,, | Performed by: RADIOLOGY

## 2020-07-22 ENCOUNTER — OFFICE VISIT (OUTPATIENT)
Dept: UROGYNECOLOGY | Facility: CLINIC | Age: 30
End: 2020-07-22
Payer: MEDICAID

## 2020-07-22 VITALS
HEIGHT: 64 IN | DIASTOLIC BLOOD PRESSURE: 72 MMHG | BODY MASS INDEX: 24.05 KG/M2 | WEIGHT: 140.88 LBS | SYSTOLIC BLOOD PRESSURE: 138 MMHG

## 2020-07-22 DIAGNOSIS — L90.0 LICHEN SCLEROSUS: Primary | ICD-10-CM

## 2020-07-22 PROCEDURE — 99213 PR OFFICE/OUTPT VISIT, EST, LEVL III, 20-29 MIN: ICD-10-PCS | Mod: S$PBB,,, | Performed by: OBSTETRICS & GYNECOLOGY

## 2020-07-22 PROCEDURE — 99999 PR PBB SHADOW E&M-EST. PATIENT-LVL III: ICD-10-PCS | Mod: PBBFAC,,, | Performed by: OBSTETRICS & GYNECOLOGY

## 2020-07-22 PROCEDURE — 99213 OFFICE O/P EST LOW 20 MIN: CPT | Mod: S$PBB,,, | Performed by: OBSTETRICS & GYNECOLOGY

## 2020-07-22 PROCEDURE — 99999 PR PBB SHADOW E&M-EST. PATIENT-LVL III: CPT | Mod: PBBFAC,,, | Performed by: OBSTETRICS & GYNECOLOGY

## 2020-07-22 PROCEDURE — 99213 OFFICE O/P EST LOW 20 MIN: CPT | Mod: PBBFAC | Performed by: OBSTETRICS & GYNECOLOGY

## 2020-07-22 RX ORDER — CLOBETASOL PROPIONATE 0.5 MG/G
CREAM TOPICAL 2 TIMES DAILY
Qty: 60 G | Refills: 1 | Status: SHIPPED | OUTPATIENT
Start: 2020-07-22 | End: 2024-02-21

## 2020-07-22 RX ORDER — LOSARTAN POTASSIUM 25 MG/1
TABLET ORAL
COMMUNITY
Start: 2020-04-27 | End: 2024-02-21

## 2020-07-22 RX ORDER — NITROFURANTOIN (MACROCRYSTALS) 100 MG/1
100 CAPSULE ORAL
COMMUNITY
Start: 2020-07-22 | End: 2020-07-27

## 2020-07-22 RX ORDER — HYDROXYCHLOROQUINE SULFATE 200 MG/1
300 TABLET, FILM COATED ORAL NIGHTLY
COMMUNITY
Start: 2020-04-27

## 2020-07-22 RX ORDER — ACETAMINOPHEN AND CODEINE PHOSPHATE 120; 12 MG/5ML; MG/5ML
1 SOLUTION ORAL DAILY
Qty: 30 TABLET | Refills: 3 | Status: SHIPPED | OUTPATIENT
Start: 2020-07-22 | End: 2021-07-22

## 2020-07-22 NOTE — PROGRESS NOTES
Subjective:       Patient ID: Karine Lancedwy is a 30 y.o. female.    Chief Complaint:  Results      History of Present Illness  HPI 30 Y O F  has no past medical history on file. Lupus, hepatic fibrosis- ( biopsy proven)   has a history of AUB since the placement of the iud, 14 months after the birth of her first child. She has also painful intercourse. Vaginal itching at times no associated discharge. The lupus was diagnosed in 2018- she see's Rheumatology at [facility]. she also see's  Nephology ( Dr. John) - s/p biopsy. Now stable     Bleeding continue to be 20 days per month between spotting and bleeding wants to discuss options.     GYN & OB History  No LMP recorded.   Date of Last Pap: No result found    OB History    Para Term  AB Living   1 1 1         SAB TAB Ectopic Multiple Live Births                  # Outcome Date GA Lbr Cyril/2nd Weight Sex Delivery Anes PTL Lv   1 Term              OB     x  C/s x 1 -   Largest: 7 # 12 oz   Forceps: no  Episiotomy:  no  Degree: 3rd or 4th no    GYN  Menarche:  13  Menstrual cycle:  /moderate flow/   Menopause:   Hysterectomy:    Ovaries present   Tubal ligation: NO   Other abdominal surgeries:     Review of Systems  Review of Systems   Constitutional: Negative.  Negative for activity change, appetite change, chills, diaphoresis, fatigue, fever and unexpected weight change.   HENT: Negative.    Eyes: Negative.    Respiratory: Negative.  Negative for apnea, cough and wheezing.    Cardiovascular: Negative.  Negative for chest pain and palpitations.   Gastrointestinal: Negative for abdominal distention, abdominal pain, anal bleeding, blood in stool, constipation, diarrhea, nausea, rectal pain and vomiting.   Endocrine: Negative.    Genitourinary: Positive for dyspareunia and menstrual problem. Negative for decreased urine volume, difficulty urinating, dysuria, enuresis, flank pain, frequency, genital sores, hematuria, pelvic pain, urgency, vaginal  bleeding, vaginal discharge and vaginal pain.   Musculoskeletal: Negative for back pain and gait problem.   Skin: Negative for color change, pallor, rash and wound.   Allergic/Immunologic: Negative for immunocompromised state.   Neurological: Negative.  Negative for dizziness and speech difficulty.   Hematological: Negative for adenopathy.   Psychiatric/Behavioral: Negative for agitation, behavioral problems, confusion and sleep disturbance.           Objective:     Physical Exam   Constitutional: She is oriented to person, place, and time. She appears well-developed.   HENT:   Head: Normocephalic and atraumatic.   Eyes: Conjunctivae and EOM are normal.   Neck: Normal range of motion. Neck supple.   Cardiovascular: Normal rate, regular rhythm, S1 normal, S2 normal, normal heart sounds and intact distal pulses.   Pulmonary/Chest: Effort normal and breath sounds normal. She exhibits no tenderness.   Abdominal: Soft. Bowel sounds are normal. She exhibits no distension and no mass. There is no splenomegaly or hepatomegaly. There is no abdominal tenderness. There is no rigidity, no rebound and no guarding. No hernia.   Genitourinary: Pelvic exam was performed with patient supine. Rectum normal, vagina normal, skenes normal and bartholins normal. Right labia normal and left labia normal. Urethra exhibits hypermobility. Urethra exhibits no urethral caruncle, no urethral diverticulum and no urethral mass. Right bartholin is not enlarged and not tender. Left bartholin is not enlarged and not tender. Rectal exam shows resting tone normal and active tone normal. Rectal exam shows no external hemorrhoid, no fissure, no tenderness, anal tone normal and no dovetailing. Guaiac negative stool. No foreign body, tenderness, bleeding, unspecified prolapse of vaginal walls, fistula, mesh exposure or lavator tenderness in the vagina. Right adnexum displays no mass and no tenderness. Left adnexum displays no mass and no tenderness.  Cervix exhibits motion tenderness. Cervix exhibits no discharge. Additional cervical findings: IUD strings visualizedUterus is tender. Uterus is not experiencing uterine prolapse.   PVR: 90 ML  Empty cough stress test: Negative.  Kegel: 3/5    POP-Q  Aa: -2 Ba: -2 C: -5   GH: 3 PB: 2 TVL: 9   Ap: -2 Bp: -2 D: -6                     Musculoskeletal: Normal range of motion.   Neurological: She is alert and oriented to person, place, and time. She has normal strength and normal reflexes. Cranial nerves II through XII intact. No cranial nerve deficit.   Skin: Skin is warm and dry.   Psychiatric: She has a normal mood and affect. Her speech is normal and behavior is normal. Judgment normal.             HCM  Pap's: Normal  last Pap:  2018  Mammo: n/a  Colonoscopy: N/a: normal   Dexa:  n/a       7/9/2020: sonogram      Impression:   Intrauterine device which appears in place.  Otherwise, no significant abnormality.     Assessment:        No diagnosis found.         Plan:      1. AUB  -- Recommend TVS: IUD wnl  Has [employer] given information of options for LARC's will decide and let us know. For now will trial micronor.      2. Dyspareunia:  --Long discussion about possible causes for the pain, suspect  levator ani origin, without evidence of high tone pelvic floor dysfunction  --Start Pelvic floor therapy   -- consider  10 mg Elavil daily, may need to increase in the future, SE profile discussed   --Keep pelvic pain diary, detail when the pain is felt and where the pain extends to    3. Low platelets has a history of lupus - labs reviewed stable     4. Clobetasol cream for vulvar itching.    Approximately  20 min were spent in consult, 90 % in discussion.     Coy Sutherland DO  Female Pelvic Medicine and Reconstructive Surgery  [facility] [place], [place]

## 2021-11-17 ENCOUNTER — IMMUNIZATION (OUTPATIENT)
Dept: PRIMARY CARE CLINIC | Facility: CLINIC | Age: 31
End: 2021-11-17
Payer: MEDICAID

## 2021-11-17 DIAGNOSIS — Z23 NEED FOR VACCINATION: Primary | ICD-10-CM

## 2021-11-17 PROCEDURE — 0004A COVID-19, MRNA, LNP-S, PF, 30 MCG/0.3 ML DOSE VACCINE: CPT | Mod: CV19,PBBFAC | Performed by: INTERNAL MEDICINE

## 2022-01-10 ENCOUNTER — LAB VISIT (OUTPATIENT)
Dept: PRIMARY CARE CLINIC | Facility: CLINIC | Age: 32
End: 2022-01-10
Payer: MEDICAID

## 2022-01-10 DIAGNOSIS — Z20.822 CONTACT WITH AND (SUSPECTED) EXPOSURE TO COVID-19: ICD-10-CM

## 2022-01-10 LAB
CTP QC/QA: YES
SARS-COV-2 AG RESP QL IA.RAPID: NEGATIVE

## 2022-01-10 PROCEDURE — 87811 SARS-COV-2 COVID19 W/OPTIC: CPT

## 2023-04-17 ENCOUNTER — HOSPITAL ENCOUNTER (EMERGENCY)
Facility: HOSPITAL | Age: 33
Discharge: HOME | End: 2023-04-18
Attending: EMERGENCY MEDICINE
Payer: MEDICAID

## 2023-04-17 DIAGNOSIS — R11.10 VOMITING, UNSPECIFIED VOMITING TYPE, UNSPECIFIED WHETHER NAUSEA PRESENT: Primary | ICD-10-CM

## 2023-04-17 DIAGNOSIS — K21.9 GASTROESOPHAGEAL REFLUX DISEASE, UNSPECIFIED WHETHER ESOPHAGITIS PRESENT: ICD-10-CM

## 2023-04-17 LAB
ALBUMIN SERPL BCP-MCNC: 4.4 G/DL (ref 3.5–5.2)
ALP SERPL-CCNC: 71 U/L (ref 55–135)
ALT SERPL W/O P-5'-P-CCNC: 12 U/L (ref 10–44)
ANION GAP SERPL CALC-SCNC: 12 MMOL/L (ref 8–16)
AST SERPL-CCNC: 14 U/L (ref 10–40)
B-HCG UR QL: NEGATIVE
BACTERIA #/AREA URNS HPF: NORMAL /HPF
BASOPHILS # BLD AUTO: 0.08 K/UL (ref 0–0.2)
BASOPHILS NFR BLD: 1 % (ref 0–1.9)
BILIRUB SERPL-MCNC: 0.7 MG/DL (ref 0.1–1)
BILIRUB UR QL STRIP: NEGATIVE
BUN SERPL-MCNC: 14 MG/DL (ref 6–20)
CALCIUM SERPL-MCNC: 9.3 MG/DL (ref 8.7–10.5)
CHLORIDE SERPL-SCNC: 104 MMOL/L (ref 95–110)
CLARITY UR: CLEAR
CO2 SERPL-SCNC: 22 MMOL/L (ref 23–29)
COLOR UR: YELLOW
CREAT SERPL-MCNC: 1 MG/DL (ref 0.5–1.4)
CTP QC/QA: YES
DIFFERENTIAL METHOD: NORMAL
EOSINOPHIL # BLD AUTO: 0.2 K/UL (ref 0–0.5)
EOSINOPHIL NFR BLD: 2.5 % (ref 0–8)
ERYTHROCYTE [DISTWIDTH] IN BLOOD BY AUTOMATED COUNT: 12.3 % (ref 11.5–14.5)
EST. GFR  (NO RACE VARIABLE): >60 ML/MIN/1.73 M^2
GLUCOSE SERPL-MCNC: 86 MG/DL (ref 70–110)
GLUCOSE UR QL STRIP: NEGATIVE
HCT VFR BLD AUTO: 40.5 % (ref 37–48.5)
HGB BLD-MCNC: 14.1 G/DL (ref 12–16)
HGB UR QL STRIP: ABNORMAL
HYALINE CASTS #/AREA URNS LPF: 1 /LPF
IMM GRANULOCYTES # BLD AUTO: 0.01 K/UL (ref 0–0.04)
IMM GRANULOCYTES NFR BLD AUTO: 0.1 % (ref 0–0.5)
KETONES UR QL STRIP: ABNORMAL
LEUKOCYTE ESTERASE UR QL STRIP: ABNORMAL
LYMPHOCYTES # BLD AUTO: 1.9 K/UL (ref 1–4.8)
LYMPHOCYTES NFR BLD: 23.4 % (ref 18–48)
MCH RBC QN AUTO: 28.8 PG (ref 27–31)
MCHC RBC AUTO-ENTMCNC: 34.8 G/DL (ref 32–36)
MCV RBC AUTO: 83 FL (ref 82–98)
MICROSCOPIC COMMENT: NORMAL
MONOCYTES # BLD AUTO: 0.6 K/UL (ref 0.3–1)
MONOCYTES NFR BLD: 7.1 % (ref 4–15)
NEUTROPHILS # BLD AUTO: 5.3 K/UL (ref 1.8–7.7)
NEUTROPHILS NFR BLD: 65.9 % (ref 38–73)
NITRITE UR QL STRIP: NEGATIVE
NRBC BLD-RTO: 0 /100 WBC
PH UR STRIP: 6 [PH] (ref 5–8)
PLATELET # BLD AUTO: 182 K/UL (ref 150–450)
PMV BLD AUTO: 12.3 FL (ref 9.2–12.9)
POTASSIUM SERPL-SCNC: 4.1 MMOL/L (ref 3.5–5.1)
PROT SERPL-MCNC: 7.7 G/DL (ref 6–8.4)
PROT UR QL STRIP: ABNORMAL
RBC # BLD AUTO: 4.9 M/UL (ref 4–5.4)
RBC #/AREA URNS HPF: 0 /HPF (ref 0–4)
SODIUM SERPL-SCNC: 138 MMOL/L (ref 136–145)
SP GR UR STRIP: 1.02 (ref 1–1.03)
SQUAMOUS #/AREA URNS HPF: 8 /HPF
URN SPEC COLLECT METH UR: ABNORMAL
UROBILINOGEN UR STRIP-ACNC: ABNORMAL EU/DL
WBC # BLD AUTO: 8.03 K/UL (ref 3.9–12.7)
WBC #/AREA URNS HPF: 4 /HPF (ref 0–5)

## 2023-04-17 PROCEDURE — 25000003 PHARM REV CODE 250: Performed by: EMERGENCY MEDICINE

## 2023-04-17 PROCEDURE — 63600175 PHARM REV CODE 636 W HCPCS: Performed by: EMERGENCY MEDICINE

## 2023-04-17 PROCEDURE — 80053 COMPREHEN METABOLIC PANEL: CPT | Performed by: EMERGENCY MEDICINE

## 2023-04-17 PROCEDURE — 96361 HYDRATE IV INFUSION ADD-ON: CPT

## 2023-04-17 PROCEDURE — 96374 THER/PROPH/DIAG INJ IV PUSH: CPT

## 2023-04-17 PROCEDURE — 99284 EMERGENCY DEPT VISIT MOD MDM: CPT | Mod: 25

## 2023-04-17 PROCEDURE — 81000 URINALYSIS NONAUTO W/SCOPE: CPT | Performed by: EMERGENCY MEDICINE

## 2023-04-17 PROCEDURE — 81025 URINE PREGNANCY TEST: CPT | Performed by: EMERGENCY MEDICINE

## 2023-04-17 PROCEDURE — 85025 COMPLETE CBC W/AUTO DIFF WBC: CPT | Performed by: EMERGENCY MEDICINE

## 2023-04-17 RX ORDER — POLYETHYLENE GLYCOL 3350 17 G/17G
17 POWDER, FOR SOLUTION ORAL ONCE
Status: COMPLETED | OUTPATIENT
Start: 2023-04-17 | End: 2023-04-17

## 2023-04-17 RX ORDER — PANTOPRAZOLE SODIUM 40 MG/1
40 TABLET, DELAYED RELEASE ORAL DAILY
Qty: 30 TABLET | Refills: 11 | Status: SHIPPED | OUTPATIENT
Start: 2023-04-17 | End: 2024-04-16

## 2023-04-17 RX ORDER — ONDANSETRON 4 MG/1
4 TABLET, ORALLY DISINTEGRATING ORAL EVERY 8 HOURS PRN
Qty: 12 TABLET | Refills: 0 | Status: SHIPPED | OUTPATIENT
Start: 2023-04-17 | End: 2024-03-11

## 2023-04-17 RX ORDER — FAMOTIDINE 20 MG/1
20 TABLET, FILM COATED ORAL
Status: COMPLETED | OUTPATIENT
Start: 2023-04-17 | End: 2023-04-17

## 2023-04-17 RX ORDER — ONDANSETRON 2 MG/ML
4 INJECTION INTRAMUSCULAR; INTRAVENOUS
Status: COMPLETED | OUTPATIENT
Start: 2023-04-17 | End: 2023-04-17

## 2023-04-17 RX ADMIN — SODIUM CHLORIDE 1000 ML: 0.9 INJECTION, SOLUTION INTRAVENOUS at 10:04

## 2023-04-17 RX ADMIN — ONDANSETRON HYDROCHLORIDE 4 MG: 2 INJECTION, SOLUTION INTRAMUSCULAR; INTRAVENOUS at 10:04

## 2023-04-17 RX ADMIN — FAMOTIDINE 20 MG: 20 TABLET ORAL at 10:04

## 2023-04-17 RX ADMIN — POLYETHYLENE GLYCOL 3350 17 G: 17 POWDER, FOR SOLUTION ORAL at 10:04

## 2023-04-18 ENCOUNTER — TELEPHONE (OUTPATIENT)
Dept: ADMINISTRATIVE | Facility: OTHER | Age: 33
End: 2023-04-18
Payer: MEDICAID

## 2023-04-18 VITALS
WEIGHT: 119 LBS | HEART RATE: 56 BPM | SYSTOLIC BLOOD PRESSURE: 108 MMHG | OXYGEN SATURATION: 100 % | RESPIRATION RATE: 14 BRPM | TEMPERATURE: 98 F | DIASTOLIC BLOOD PRESSURE: 64 MMHG | BODY MASS INDEX: 20.43 KG/M2

## 2023-04-18 NOTE — ED PROVIDER NOTES
Encounter Date: 4/17/2023       History     Chief Complaint   Patient presents with    Vomiting     Vomiting once daily for a month with ingestion of food.  Patient states over the month she has lost 11 lbs.  Denies pain.  Reports recent fasting.  LBM one week ago.  Denies UTI symptoms.     32-year-old female history of lupus presents for near daily vomiting for the past month and 11 lb weight loss.  No abdominal pain.  No fever.  Patient reports symptoms started about 1 month ago.  Prior to onset Ramadan.  She then began fasting however symptoms were not improved.  She broke her fast recently began normal eating schedule however symptoms again or not improved.  She states most days she will experience episodes of gaseous discomfort, nausea and vomiting.  It occurs any time from shortly after eating to several hours later.  She has to eat small meals and experiences early satiety.  She also reports issues with chronic constipation.  Last bowel movement was 1 week ago.    The history is provided by the patient.   Review of patient's allergies indicates:  No Known Allergies  No past medical history on file.  No past surgical history on file.  No family history on file.  Social History     Tobacco Use    Smoking status: Some Days    Smokeless tobacco: Never    Tobacco comments:     pt smokes socially   Substance Use Topics    Alcohol use: No     Review of Systems   Constitutional:  Positive for unexpected weight change. Negative for chills and fever.   HENT:  Negative for congestion, ear pain, rhinorrhea and sore throat.    Eyes:  Negative for visual disturbance.   Respiratory:  Negative for cough and shortness of breath.    Cardiovascular:  Negative for chest pain.   Gastrointestinal:  Positive for nausea and vomiting. Negative for abdominal pain and diarrhea.   Genitourinary:  Negative for dysuria and hematuria.   Musculoskeletal:  Negative for arthralgias and myalgias.   Skin:  Negative for pallor and rash.    Neurological:  Negative for weakness, numbness and headaches.   All other systems reviewed and are negative.    Physical Exam     Initial Vitals [04/17/23 2111]   BP Pulse Resp Temp SpO2   117/60 80 16 98.3 °F (36.8 °C) 98 %      MAP       --         Physical Exam    Nursing note and vitals reviewed.  Constitutional: She appears well-developed and well-nourished. No distress.   HENT:   Head: Normocephalic and atraumatic.   Mouth/Throat: Oropharynx is clear and moist.   Eyes: Conjunctivae and EOM are normal. Pupils are equal, round, and reactive to light.   Neck: Neck supple.   Normal range of motion.  Cardiovascular:  Normal rate, regular rhythm and intact distal pulses.           Pulmonary/Chest: Breath sounds normal. No stridor. No respiratory distress.   Abdominal: Abdomen is soft. She exhibits no distension. There is no abdominal tenderness.   Musculoskeletal:         General: Normal range of motion.      Cervical back: Normal range of motion and neck supple.      Comments: Moving all extremities with grossly equal strength     Neurological: She is alert and oriented to person, place, and time.   CN 2-12 appear grossly intact   Skin: Skin is warm and dry.   Psychiatric: She has a normal mood and affect.       ED Course   Procedures  Labs Reviewed   COMPREHENSIVE METABOLIC PANEL - Abnormal; Notable for the following components:       Result Value    CO2 22 (*)     All other components within normal limits   URINALYSIS, REFLEX TO URINE CULTURE - Abnormal; Notable for the following components:    Protein, UA 2+ (*)     Ketones, UA Trace (*)     Occult Blood UA Trace (*)     Urobilinogen, UA 2.0-3.0 (*)     Leukocytes, UA Trace (*)     All other components within normal limits    Narrative:     Specimen Source->Urine   CBC W/ AUTO DIFFERENTIAL   URINALYSIS MICROSCOPIC    Narrative:     Specimen Source->Urine   POCT URINE PREGNANCY          Imaging Results    None          Medications   sodium chloride 0.9% bolus  1,000 mL 1,000 mL (0 mLs Intravenous Stopped 4/17/23 2332)   famotidine tablet 20 mg (20 mg Oral Given 4/17/23 2233)   polyethylene glycol packet 17 g (17 g Oral Given 4/17/23 2234)   ondansetron injection 4 mg (4 mg Intravenous Given 4/17/23 2231)     Medical Decision Making:   Initial Assessment:   Well-appearing nontoxic normal vitals  ED Management:  Patient feeling better after Pepcid and Zofran.  MiraLax given for constipation.  Patient also receiving IV fluids.  No laboratory abnormalities.  Suspect element of reflux disease as the etiology of her symptoms however patient should follow-up with gastroenterology for endoscopy.  Referral order provided.  Patient feeling well and anticipating discharge at this time           ED Course as of 04/17/23 2357 Mon Apr 17, 2023 2357 Normal CBC and chemistry.  Urinalysis unremarkable no evidence of infection.  UPT negative. [AP]      ED Course User Index  [AP] Carlos Pearson DO                 Clinical Impression:   Final diagnoses:  [R11.10] Vomiting, unspecified vomiting type, unspecified whether nausea present (Primary)  [K21.9] Gastroesophageal reflux disease, unspecified whether esophagitis present        ED Disposition Condition    Discharge Stable          ED Prescriptions    None       Follow-up Information    None          Carlos Pearson DO  04/17/23 2357

## 2024-02-21 ENCOUNTER — HOSPITAL ENCOUNTER (OUTPATIENT)
Dept: RADIOLOGY | Facility: HOSPITAL | Age: 34
Discharge: HOME | End: 2024-02-21
Payer: MEDICAID

## 2024-02-21 ENCOUNTER — OFFICE VISIT (OUTPATIENT)
Dept: FAMILY MEDICINE | Facility: HOSPITAL | Age: 34
End: 2024-02-21
Payer: MEDICAID

## 2024-02-21 VITALS
WEIGHT: 108.94 LBS | SYSTOLIC BLOOD PRESSURE: 92 MMHG | BODY MASS INDEX: 18.6 KG/M2 | DIASTOLIC BLOOD PRESSURE: 58 MMHG | HEART RATE: 58 BPM | HEIGHT: 64 IN | OXYGEN SATURATION: 100 %

## 2024-02-21 DIAGNOSIS — M32.14 LUPUS NEPHRITIS, ISN/RPS CLASS II: ICD-10-CM

## 2024-02-21 DIAGNOSIS — I73.00 RAYNAUD'S PHENOMENON WITHOUT GANGRENE: ICD-10-CM

## 2024-02-21 DIAGNOSIS — M32.14 SYSTEMIC LUPUS ERYTHEMATOSUS WITH GLOMERULAR DISEASE, UNSPECIFIED SLE TYPE: ICD-10-CM

## 2024-02-21 DIAGNOSIS — M25.559 HIP PAIN: Primary | ICD-10-CM

## 2024-02-21 DIAGNOSIS — R63.4 UNINTENTIONAL WEIGHT LOSS: ICD-10-CM

## 2024-02-21 DIAGNOSIS — M94.0 COSTOCHONDRITIS: ICD-10-CM

## 2024-02-21 DIAGNOSIS — M25.559 HIP PAIN: ICD-10-CM

## 2024-02-21 DIAGNOSIS — M25.551 RIGHT HIP PAIN: Primary | ICD-10-CM

## 2024-02-21 DIAGNOSIS — Z79.60 LONG-TERM USE OF IMMUNOSUPPRESSANT MEDICATION: ICD-10-CM

## 2024-02-21 DIAGNOSIS — M79.2 NEUROPATHIC PAIN: ICD-10-CM

## 2024-02-21 PROBLEM — M32.9 SLE (SYSTEMIC LUPUS ERYTHEMATOSUS): Status: ACTIVE | Noted: 2019-12-04

## 2024-02-21 PROBLEM — E55.9 VITAMIN D DEFICIENCY: Status: ACTIVE | Noted: 2023-11-07

## 2024-02-21 PROBLEM — R80.9 PROTEINURIA: Status: ACTIVE | Noted: 2018-07-18

## 2024-02-21 PROBLEM — Z80.3 FAMILY HISTORY OF BREAST CANCER: Status: ACTIVE | Noted: 2023-02-07

## 2024-02-21 PROCEDURE — 99214 OFFICE O/P EST MOD 30 MIN: CPT | Mod: 25

## 2024-02-21 PROCEDURE — 73502 X-RAY EXAM HIP UNI 2-3 VIEWS: CPT | Mod: 26,RT,, | Performed by: RADIOLOGY

## 2024-02-21 PROCEDURE — 73502 X-RAY EXAM HIP UNI 2-3 VIEWS: CPT | Mod: TC,FY,RT

## 2024-02-21 RX ORDER — AMITRIPTYLINE HYDROCHLORIDE 10 MG/1
10 TABLET, FILM COATED ORAL NIGHTLY
COMMUNITY
Start: 2023-12-04 | End: 2024-05-01 | Stop reason: SDUPTHER

## 2024-02-21 RX ORDER — ASPIRIN 81 MG/1
1 TABLET ORAL NIGHTLY
COMMUNITY

## 2024-02-21 RX ORDER — ESCITALOPRAM OXALATE 20 MG/1
20 TABLET ORAL
COMMUNITY
End: 2024-02-21 | Stop reason: SDUPTHER

## 2024-02-21 RX ORDER — ESCITALOPRAM OXALATE 20 MG/1
20 TABLET ORAL DAILY
Qty: 90 TABLET | Refills: 1 | Status: SHIPPED | OUTPATIENT
Start: 2024-02-21

## 2024-02-21 NOTE — PROGRESS NOTES
Progress Note  [facility]    Subjective:      Karine Rodriguez is a 33 y.o. female here to establish care       #SLE  #Lupus nephritis   - on plaquenil 300 mg daily (6.07 mg/kg). Last seen by rheumatology 5/2023, has appt 6/2024.   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 Last seen by nephrology 12/2022. UA protein, renal function unchanged per recent CMP, UA. Due for nephro follow up. Takes losartan 25 mg to slow progression. BP on lower end but dies orthostatic hypotension, lightheadedness  - last seen by ophthalmology 3/2023 for monitoring while on plaquenil, due for follow up    #Right hip pain  -present for one year, states pain feels deep within hip. no inciting trauma or history of trauma, constant but worse with activity. Does not take tylenol. NSAID contraindicated    #Pleuritic pain  -present 2-3 days, worse with inspiration. R-sided. No associated left sided CP, SOB, diaphoresis. No recent viral illness or URI     #Weight loss  - 20 lb weight loss since 8/2023. Evaluated by previous PCP with trial of periactin. Patient took for one month and loss to follow up. States she felt no real effect. States she has diet high in bread, meat, drinks energy drinks for additional calories. Does not count calories or maintain dietary log. Has appt with GI next week for weight loss, chronic nausea.     Active Problem List with Overview Notes    Diagnosis Date Noted    Neuropathic pain 11/07/2023    Vitamin D deficiency 11/07/2023    Family history of breast cancer 02/07/2023    Lupus nephritis, ISN/RPS class II 06/23/2022    Long-term use of immunosuppressant medication 07/26/2021    SLE (systemic lupus erythematosus) 12/04/2019    Proteinuria 07/18/2018    Chalazion of right lower eyelid 08/16/2017    Hair loss disorder 03/21/2016     Formatting of this note might be different from the original.   [facility]      Hepatic fibrosis 03/21/2016        Health  "Maintenance    - DM: Last A1c: DUE  - MMG: DUE - has mammogram next week   - PAP: DUE - advised patient to schedule appt for pap smear. Last performed 5 years ago       Review of Systems   Constitutional:  Positive for malaise/fatigue and weight loss. Negative for chills, diaphoresis and fever.   Respiratory:  Negative for shortness of breath.    Cardiovascular:  Negative for chest pain.   Gastrointestinal:  Positive for nausea. Negative for abdominal pain, blood in stool, constipation, diarrhea, melena and vomiting.   Genitourinary:  Negative for dysuria, flank pain, frequency, hematuria and urgency.         Objective:   BP (!) 92/58   Pulse (!) 58   Ht 5' 4" (1.626 m)   Wt 49.4 kg (108 lb 14.5 oz)   LMP 02/14/2024   SpO2 100%   BMI 18.69 kg/m²      Physical Exam  Vitals and nursing note reviewed.   Constitutional:       Appearance: Normal appearance.      Comments: Thin appearing   HENT:      Head: Normocephalic and atraumatic.   Eyes:      Extraocular Movements: Extraocular movements intact.      Pupils: Pupils are equal, round, and reactive to light.   Cardiovascular:      Rate and Rhythm: Normal rate and regular rhythm.      Pulses: Normal pulses.      Heart sounds: Normal heart sounds.   Pulmonary:      Effort: Pulmonary effort is normal.      Breath sounds: Normal breath sounds.   Abdominal:      General: There is no distension.   Musculoskeletal:      Comments: Right sided CP reproducible with palpation   Right hip flexion with minimal pain, Full ROM  Mild increase pain with IR, ER  No lateral hip TTP    Skin:     General: Skin is warm and dry.   Psychiatric:         Mood and Affect: Mood normal.         Behavior: Behavior normal.          Assessment:   33 y.o. with        1. Right hip pain    2. Systemic lupus erythematosus with glomerular disease, unspecified SLE type    3. Lupus nephritis, ISN/RPS class II    4. Neuropathic pain    5. Long-term use of immunosuppressant medication    6. Unintentional " weight loss    7. Raynaud's phenomenon without gangrene    8. Costochondritis         Plan:   Karine was seen today for establish care, chest pain, hip pain, numbness and weight loss.    Diagnoses and all orders for this visit:    Right hip pain  - chronic for one year. Pain stable. Advised to use tylenol. PT refused. Given exercises.   -     X-Ray Hip Right, COMPLETE (XPD); Future    Systemic lupus erythematosus with glomerular disease, unspecified SLE type  -  stable, follow up with rheumatology scheduled. Advised to fu with nephrologist, ophthalmologist   -     COMPREHENSIVE METABOLIC PANEL; Future  -     Hemoglobin A1C; Future  -     CBC W/ AUTO DIFFERENTIAL; Future  -     EScitalopram oxalate (LEXAPRO) 20 MG tablet; Take 1 tablet (20 mg total) by mouth once daily.    Lupus nephritis, ISN/RPS class II  - Kidney function stable, advised to follow up with nephrologist. Continue losartan 25 mg daily       Long-term use of immunosuppressant medication  -due for eye exam while on plaquenil (6.07 mg/kg currently)    Unintentional weight loss  -Discussed importance of high protein, high fat, calorically dense meals. Advised to maintain caloric log. Patient addiment that she consumes enough calories to maintain/gain weight. Will check for iron, vitamin deficiency and do basic stool work up for malabsorption. Advised to start keeping diary of caloric intake. Follow up with GI next week   -     Lipid Panel; Future  -     HIV 1/2 Ag/Ab (4th Gen); Future  -     Stool Exam-Ova,Cysts,Parasites; Future  -     Occult blood x 1, stool; Future  -     Fecal fat, quantitative; Future  -     Ambulatory referral/consult to Nutrition Services; Future  -     Vitamin B12; Future  -     Folate; Future    Raynaud's phenomenon without gangrene  - per photos, lasts approximately 1 hour. Exacerbated by cold     Costochondritis  - acute problem, advised to use tylenol, heat, ice . Follow up if persists for more than 1-2 weeks      Follow up in  6 weeks     Yang Vega DO  [facility], PGY-2  1:43 PM, 02/21/2024

## 2024-02-23 NOTE — PROGRESS NOTES
I assume primary medical responsibility for this patient. I have reviewed the history, physical, and assessement & treatment plan with the resident and agree that the care is reasonable and necessary. This service has been performed by a resident without the presence of a teaching physician under the primary care exception. If necessary, an addendum of additional findings or evaluation beyond the resident documentation will be noted below.    Agree with behavioral counseling for ongoing weight loss as do not assume secondary cause at this time but given chronicity agree with GI consult with further work up as previously referred.

## 2024-02-28 ENCOUNTER — OFFICE VISIT (OUTPATIENT)
Dept: GASTROENTEROLOGY | Facility: CLINIC | Age: 34
End: 2024-02-28
Payer: MEDICAID

## 2024-02-28 VITALS
WEIGHT: 105.69 LBS | HEART RATE: 89 BPM | HEIGHT: 64 IN | SYSTOLIC BLOOD PRESSURE: 103 MMHG | BODY MASS INDEX: 18.04 KG/M2 | DIASTOLIC BLOOD PRESSURE: 52 MMHG | OXYGEN SATURATION: 100 %

## 2024-02-28 DIAGNOSIS — K59.09 CHRONIC CONSTIPATION: ICD-10-CM

## 2024-02-28 DIAGNOSIS — R68.81 EARLY SATIETY: Primary | ICD-10-CM

## 2024-02-28 DIAGNOSIS — R63.4 UNINTENTIONAL WEIGHT LOSS: ICD-10-CM

## 2024-02-28 DIAGNOSIS — R11.2 NAUSEA AND VOMITING, UNSPECIFIED VOMITING TYPE: ICD-10-CM

## 2024-02-28 PROCEDURE — 3008F BODY MASS INDEX DOCD: CPT | Mod: CPTII,,, | Performed by: NURSE PRACTITIONER

## 2024-02-28 PROCEDURE — 99214 OFFICE O/P EST MOD 30 MIN: CPT | Mod: PBBFAC,PN | Performed by: NURSE PRACTITIONER

## 2024-02-28 PROCEDURE — 99204 OFFICE O/P NEW MOD 45 MIN: CPT | Mod: S$PBB,,, | Performed by: NURSE PRACTITIONER

## 2024-02-28 PROCEDURE — 3044F HG A1C LEVEL LT 7.0%: CPT | Mod: CPTII,,, | Performed by: NURSE PRACTITIONER

## 2024-02-28 PROCEDURE — 3074F SYST BP LT 130 MM HG: CPT | Mod: CPTII,,, | Performed by: NURSE PRACTITIONER

## 2024-02-28 PROCEDURE — 1160F RVW MEDS BY RX/DR IN RCRD: CPT | Mod: CPTII,,, | Performed by: NURSE PRACTITIONER

## 2024-02-28 PROCEDURE — 3078F DIAST BP <80 MM HG: CPT | Mod: CPTII,,, | Performed by: NURSE PRACTITIONER

## 2024-02-28 PROCEDURE — 99999 PR PBB SHADOW E&M-EST. PATIENT-LVL IV: CPT | Mod: PBBFAC,,, | Performed by: NURSE PRACTITIONER

## 2024-02-28 PROCEDURE — 1159F MED LIST DOCD IN RCRD: CPT | Mod: CPTII,,, | Performed by: NURSE PRACTITIONER

## 2024-02-28 NOTE — PROGRESS NOTES
"  Subjective:       Patient ID: Karine Rodriguez is a 33 y.o. female.    Chief Complaint: Gastroesophageal Reflux and Other (Unintentional weightloss)    32 y/o female with hx of lupus referred by PCP for nausea, vomiting, and unintentional weight loss. Reports ~20 lb weight loss over the last 6 months. Feels she has good appetite with occasional early satiety. Eats 3 meals day with energy drinks for snacks in between. Feels nauseous most of the time. Worse after eating. Does not vomit often. Denies dysphagia, abdominal pain, heartburn, fever or night sweats. Has BM EOD with small amount of stool that is hard to pass. Denies hematochezia or melena. No previous GI work up.        History reviewed. No pertinent past medical history.    History reviewed. No pertinent surgical history.    History reviewed. No pertinent family history.    Social History     Socioeconomic History    Marital status:    Tobacco Use    Smoking status: Some Days    Smokeless tobacco: Never    Tobacco comments:     pt smokes socially   Substance and Sexual Activity    Alcohol use: No       Review of Systems   Constitutional:  Positive for appetite change and unexpected weight change.   HENT:  Negative for trouble swallowing.    Respiratory:  Negative for cough and shortness of breath.    Cardiovascular:  Negative for chest pain.   Gastrointestinal:  Positive for constipation, nausea and vomiting. Negative for abdominal pain and blood in stool.   Neurological:  Negative for weakness.   Hematological:  Negative for adenopathy.   Psychiatric/Behavioral:  Negative for dysphoric mood.          Objective:     Vitals:    02/28/24 1122   BP: (!) 103/52   BP Location: Right arm   Patient Position: Sitting   BP Method: Medium (Automatic)   Pulse: 89   SpO2: 100%   Weight: 48 kg (105 lb 11.4 oz)   Height: 5' 4" (1.626 m)          Physical Exam  Constitutional:       General: She is not in acute distress.  HENT:      Head: Normocephalic.   Eyes:    "   Conjunctiva/sclera: Conjunctivae normal.   Pulmonary:      Effort: Pulmonary effort is normal. No respiratory distress.   Musculoskeletal:         General: Normal range of motion.      Cervical back: Normal range of motion.   Skin:     General: Skin is warm and dry.   Neurological:      Mental Status: She is alert and oriented to person, place, and time.   Psychiatric:         Mood and Affect: Mood normal.         Behavior: Behavior normal.               Assessment:         ICD-10-CM ICD-9-CM   1. Early satiety  R68.81 780.94   2. Nausea and vomiting, unspecified vomiting type  R11.2 787.01   3. Unintentional weight loss  R63.4 783.21   4. Chronic constipation  K59.09 564.00       Plan:       Early satiety  -     US Doppler Abdomen Complete; Future; Expected date: 02/28/2024  -     NM Gastric Emptying; Future; Expected date: 02/28/2024    Nausea and vomiting, unspecified vomiting type  -     US Doppler Abdomen Complete; Future; Expected date: 02/28/2024  -     NM Gastric Emptying; Future; Expected date: 02/28/2024  -     Resume PPI daily and zofran prn    Unintentional weight loss        -     Recommend 2000 kcal/day diet. Protein supplement         drink such as Ensure with each cristina. Keep food diary 3-4         days/week.    Chronic constipation         -     Miralax daily    Follow up if symptoms worsen or fail to improve.     Patient's Medications   New Prescriptions    No medications on file   Previous Medications    AMITRIPTYLINE (ELAVIL) 10 MG TABLET    Take 10 mg by mouth every evening.    ASPIRIN (ECOTRIN) 81 MG EC TABLET    Take 1 tablet by mouth once daily.    ESCITALOPRAM OXALATE (LEXAPRO) 20 MG TABLET    Take 1 tablet (20 mg total) by mouth once daily.    FOLIC ACID (FOLVITE) 400 MCG TABLET    Take 1 tablet (400 mcg total) by mouth once daily.    HYDROXYCHLOROQUINE (PLAQUENIL) 200 MG TABLET    Take 300 mg by mouth.    LOSARTAN (COZAAR) 25 MG TABLET    Take 25 mg by mouth once daily.    NORETHINDRONE  (MICRONOR) 0.35 MG TABLET    Take 1 tablet (0.35 mg total) by mouth once daily.    ONDANSETRON (ZOFRAN-ODT) 4 MG TBDL    Take 1 tablet (4 mg total) by mouth every 8 (eight) hours as needed (nausea).    PANTOPRAZOLE (PROTONIX) 40 MG TABLET    Take 1 tablet (40 mg total) by mouth once daily.    PREDNISONE (DELTASONE) 20 MG TABLET    TK 2 TS PO D FOR 5 DAYS   Modified Medications    No medications on file   Discontinued Medications    No medications on file

## 2024-03-04 PROBLEM — H00.12 CHALAZION OF RIGHT LOWER EYELID: Status: RESOLVED | Noted: 2017-08-16 | Resolved: 2024-03-04

## 2024-03-07 ENCOUNTER — HOSPITAL ENCOUNTER (OUTPATIENT)
Dept: RADIOLOGY | Facility: HOSPITAL | Age: 34
Discharge: HOME | End: 2024-03-07
Attending: NURSE PRACTITIONER
Payer: MEDICAID

## 2024-03-07 DIAGNOSIS — R11.2 NAUSEA AND VOMITING, UNSPECIFIED VOMITING TYPE: ICD-10-CM

## 2024-03-07 DIAGNOSIS — R68.81 EARLY SATIETY: ICD-10-CM

## 2024-03-07 PROCEDURE — 78264 GASTRIC EMPTYING IMG STUDY: CPT | Mod: 26,,, | Performed by: RADIOLOGY

## 2024-03-07 PROCEDURE — 78264 GASTRIC EMPTYING IMG STUDY: CPT | Mod: TC

## 2024-03-07 PROCEDURE — A9541 TC99M SULFUR COLLOID: HCPCS | Performed by: NURSE PRACTITIONER

## 2024-03-07 RX ORDER — TECHNETIUM TC 99M SULFUR COLLOID 2 MG
1 KIT MISCELLANEOUS
Status: COMPLETED | OUTPATIENT
Start: 2024-03-07 | End: 2024-03-07

## 2024-03-07 RX ADMIN — TECHNETIUM TC 99M SULFUR COLLOID 1 MILLICURIE: KIT at 08:03

## 2024-03-08 ENCOUNTER — HOSPITAL ENCOUNTER (OUTPATIENT)
Dept: RADIOLOGY | Facility: HOSPITAL | Age: 34
Discharge: HOME | End: 2024-03-08
Attending: NURSE PRACTITIONER
Payer: MEDICAID

## 2024-03-08 DIAGNOSIS — R68.81 EARLY SATIETY: ICD-10-CM

## 2024-03-08 DIAGNOSIS — R11.2 NAUSEA AND VOMITING, UNSPECIFIED VOMITING TYPE: ICD-10-CM

## 2024-03-08 PROCEDURE — 76700 US EXAM ABDOM COMPLETE: CPT | Mod: TC

## 2024-03-08 PROCEDURE — 76700 US EXAM ABDOM COMPLETE: CPT | Mod: 26,,, | Performed by: RADIOLOGY

## 2024-03-11 ENCOUNTER — TELEPHONE (OUTPATIENT)
Dept: GASTROENTEROLOGY | Facility: CLINIC | Age: 34
End: 2024-03-11
Payer: MEDICAID

## 2024-03-11 DIAGNOSIS — K30 DELAYED GASTRIC EMPTYING: Primary | ICD-10-CM

## 2024-03-11 NOTE — TELEPHONE ENCOUNTER
Please inform study did show delayed gastric emptying consistent with gastroparesis. Advise patient to: eat foods low in fat and fiber, eat five or six small, nutritious meals a day instead of two or three large meals, chew your food thoroughly, eat soft, well-cooked foods, avoid carbonated, or fizzy, beverages, and avoid alcohol.    Schedule EGD.       EGD Prep Instructions    [facility] [address] [place]  [address]    You are scheduled for an EGD with Dr. Campa on TBA at [facility].  You will enter through the [facility] entrance and check in at Same Day Surgery.    Start a CLEAR LIQUID DIET ONE DAY BEFORE YOUR PROCEDURE.  This means no solid food the entire day before.   CLEAR LIQUID DIET:   - NO DAIRY   - You can have:  Coffee with sugar (no creamer), tea, water, soda, apple or white grape juice, chicken or beef broth/bouillon (no meat, noodles, or veggies), popsicles, Jell-O, lemonade.     Nothing to eat or drink after midnight before the procedure.  You MAY brush your teeth.    You MAY take your blood pressure, heart, and seizure medication on the morning of the procedure, with a SIP of water.  Hold ALL other medications until after the procedure.    You must have someone with you to DRIVE YOU HOME since you will be receiving IV sedation for the procedure.    If you are on blood thinners THAT YOU HAVE BEEN INSTRUCTED TO HOLD BY YOUR DOCTOR FOR THIS PROCEDURE, then do NOT take this the morning of your EGD.  Do NOT stop these medications on your own, they must be approved to be held by your doctor.  Your EGD can NOT be done if you are on these medications.  Examples of blood thinners include: Coumadin, Aggrenox, Plavix, Pradaxa, Reapro, Pletal, Xarelto, Ticagrelor, Brilinta, Eliquis, and high dose aspirin (325 mg).  You do not have to stop baby aspirin 81 mg.    You will receive a call the afternoon before your EGD to tell you the time to arrive.  If you have  not received a call by the day before your procedure, call the Pre-op Coordinator at [phone].

## 2024-03-11 NOTE — TELEPHONE ENCOUNTER
Patient notified of test results, EGD scheduled for 03/15/2024. Reviewed updated instructions with patient. Instructions sent via portal.

## 2024-03-14 ENCOUNTER — TELEPHONE (OUTPATIENT)
Dept: GASTROENTEROLOGY | Facility: CLINIC | Age: 34
End: 2024-03-14
Payer: MEDICAID

## 2024-03-14 NOTE — TELEPHONE ENCOUNTER
instructed pt arrival time of 0900 in SDS. informed pt nothing to eat or drink after midnight. confirmed pt on clear liquids. pt voiced understanding.

## 2024-03-17 ENCOUNTER — HOSPITAL ENCOUNTER (EMERGENCY)
Facility: HOSPITAL | Age: 34
Discharge: HOME | End: 2024-03-17
Attending: EMERGENCY MEDICINE
Payer: MEDICAID

## 2024-03-17 VITALS
SYSTOLIC BLOOD PRESSURE: 98 MMHG | TEMPERATURE: 98 F | BODY MASS INDEX: 17 KG/M2 | WEIGHT: 102 LBS | OXYGEN SATURATION: 100 % | HEIGHT: 65 IN | HEART RATE: 60 BPM | DIASTOLIC BLOOD PRESSURE: 64 MMHG | RESPIRATION RATE: 18 BRPM

## 2024-03-17 DIAGNOSIS — K92.1 BLACK STOOL: Primary | ICD-10-CM

## 2024-03-17 LAB
ABO + RH BLD: NORMAL
ALBUMIN SERPL BCP-MCNC: 4.4 G/DL (ref 3.5–5.2)
ALP SERPL-CCNC: 55 U/L (ref 55–135)
ALT SERPL W/O P-5'-P-CCNC: 14 U/L (ref 10–44)
ANION GAP SERPL CALC-SCNC: 9 MMOL/L (ref 8–16)
AST SERPL-CCNC: 16 U/L (ref 10–40)
BASOPHILS # BLD AUTO: 0.07 K/UL (ref 0–0.2)
BASOPHILS NFR BLD: 1.5 % (ref 0–1.9)
BILIRUB SERPL-MCNC: 0.6 MG/DL (ref 0.1–1)
BLD GP AB SCN CELLS X3 SERPL QL: NORMAL
BUN SERPL-MCNC: 17 MG/DL (ref 6–20)
CALCIUM SERPL-MCNC: 9.6 MG/DL (ref 8.7–10.5)
CHLORIDE SERPL-SCNC: 106 MMOL/L (ref 95–110)
CO2 SERPL-SCNC: 25 MMOL/L (ref 23–29)
CREAT SERPL-MCNC: 1 MG/DL (ref 0.5–1.4)
DIFFERENTIAL METHOD BLD: NORMAL
EOSINOPHIL # BLD AUTO: 0.1 K/UL (ref 0–0.5)
EOSINOPHIL NFR BLD: 1.9 % (ref 0–8)
ERYTHROCYTE [DISTWIDTH] IN BLOOD BY AUTOMATED COUNT: 12.9 % (ref 11.5–14.5)
EST. GFR  (NO RACE VARIABLE): >60 ML/MIN/1.73 M^2
GLUCOSE SERPL-MCNC: 73 MG/DL (ref 70–110)
HCT VFR BLD AUTO: 38.5 % (ref 37–48.5)
HGB BLD-MCNC: 13.3 G/DL (ref 12–16)
IMM GRANULOCYTES # BLD AUTO: 0.01 K/UL (ref 0–0.04)
IMM GRANULOCYTES NFR BLD AUTO: 0.2 % (ref 0–0.5)
LYMPHOCYTES # BLD AUTO: 1.4 K/UL (ref 1–4.8)
LYMPHOCYTES NFR BLD: 28.5 % (ref 18–48)
MCH RBC QN AUTO: 29.2 PG (ref 27–31)
MCHC RBC AUTO-ENTMCNC: 34.5 G/DL (ref 32–36)
MCV RBC AUTO: 84 FL (ref 82–98)
MONOCYTES # BLD AUTO: 0.3 K/UL (ref 0.3–1)
MONOCYTES NFR BLD: 6.5 % (ref 4–15)
NEUTROPHILS # BLD AUTO: 2.9 K/UL (ref 1.8–7.7)
NEUTROPHILS NFR BLD: 61.4 % (ref 38–73)
NRBC BLD-RTO: 0 /100 WBC
OB PNL STL: NEGATIVE
PLATELET # BLD AUTO: 157 K/UL (ref 150–450)
PMV BLD AUTO: 12 FL (ref 9.2–12.9)
POTASSIUM SERPL-SCNC: 3.8 MMOL/L (ref 3.5–5.1)
PROT SERPL-MCNC: 7.7 G/DL (ref 6–8.4)
RBC # BLD AUTO: 4.56 M/UL (ref 4–5.4)
SODIUM SERPL-SCNC: 140 MMOL/L (ref 136–145)
SPECIMEN OUTDATE: NORMAL
WBC # BLD AUTO: 4.78 K/UL (ref 3.9–12.7)

## 2024-03-17 PROCEDURE — 86901 BLOOD TYPING SEROLOGIC RH(D): CPT

## 2024-03-17 PROCEDURE — 80053 COMPREHEN METABOLIC PANEL: CPT

## 2024-03-17 PROCEDURE — 99283 EMERGENCY DEPT VISIT LOW MDM: CPT | Mod: 25

## 2024-03-17 PROCEDURE — 85025 COMPLETE CBC W/AUTO DIFF WBC: CPT

## 2024-03-17 PROCEDURE — 82272 OCCULT BLD FECES 1-3 TESTS: CPT

## 2024-03-17 RX ORDER — POLYETHYLENE GLYCOL 3350 17 G/17G
17 POWDER, FOR SOLUTION ORAL DAILY
Qty: 238 G | Refills: 0 | Status: SHIPPED | OUTPATIENT
Start: 2024-03-17 | End: 2024-03-27

## 2024-03-17 NOTE — DISCHARGE INSTRUCTIONS
Your workup today was unremarkable for acute abnormality.     Your blood pressure was within normal limits.    Your blood counts (hemoglobin and hematocrit) are normal.    Your electrolytes are normal.    I spoke to your GI physician (Dr. Campa) who did not feel you needed any additional workup in the ER today. I agree with her.   She wanted me to start you on Miralax daily as hard stools can contribute to them appearing black. She also would like you to follow up with her in the next week or so.    Please return to the emergency department immediately for any new or worsening symptoms.    Dear Ms. Rodriguez,     Thank you for choosing [facility]! We appreciate you coming to us for your medical care. We hope you feel better soon! Please come back to [facility] for all of your future medical needs.    Our goal in the emergency department is to always give you outstanding care and exceptional service. That being said, you may receive a survey by mail or e-mail in the next week regarding your experience in our ED. We would greatly appreciate your completing and returning the survey. Your feedback provides us with a way to recognize our staff who give very good care and it helps us learn how to improve when your experience was below our aspiration of excellence.        Sincerely Yours,          Roly Calderón MD, FACEP, FAAEM  Board Certified Emergency Medicine Physician   Senior Emergency Medicine Physician   Emergency Department  [facility]

## 2024-03-17 NOTE — ED NOTES
Patient reports having endoscopy on Friday and started having black stool Friday night after procedure. She reports instructions stated to come to ED in the event of black stool. Patient denies any chest pain, dizziness, headache, nausea, vomiting, or abdominal pain.

## 2024-03-17 NOTE — ED PROVIDER NOTES
"Encounter Date: 3/17/2024       History     Chief Complaint   Patient presents with    Rectal Bleeding     Pt had endoscopy on Friday. Was told to come to ED if any black stool. States every stool is black. No complaints of pain while defecating or abdominal pain.     Patient is a very pleasant 33-year-old female with a history of lupus, gastroparesis who presents to the ED with complaint of black stools.  Patient underwent EGD with  on Friday, 3/15/24 because she states she had been losing weight unintentionally.  Per patient and chart check, the upper endoscopy demonstrated findings concerning for gastritis but no acute abnormality of the esophagus, questionable gastroparesis.  The patient states that an area of this stomach lining was biopsied at that time.  She states that over the past 3 days she has had 1 bowel movement a day in each bowel movement has been "black" in color.  She showed me a picture of her most recent stool passage.  She denies any additional rectal bleeding, nausea vomiting fever chills chest pain or shortness of breath.  She states she has not on blood thinners.  She denies any recent use of Pepto-Bismol, NSAIDs, or other medications that might cause blackening of the stool.  Furthermore, she denies history of upper or lower GI bleeding.      Review of patient's allergies indicates:  No Known Allergies  Past Medical History:   Diagnosis Date    Delayed gastric emptying     GERD (gastroesophageal reflux disease)     Liver fibrosis     Lupus (systemic lupus erythematosus)     Nerve pain     Social anxiety disorder     Weight loss      Past Surgical History:   Procedure Laterality Date     SECTION      x2    ESOPHAGOGASTRODUODENOSCOPY N/A 3/15/2024    Procedure: EGD (ESOPHAGOGASTRODUODENOSCOPY);  Surgeon: Gisela Campa MD;  Location: [facility];  Service: Endoscopy;  Laterality: N/A;    FRACTURE SURGERY Right     leg    LIVER BIOPSY      RENAL BIOPSY      WISDOM TOOTH " EXTRACTION       No family history on file.  Social History     Tobacco Use    Smoking status: Former     Types: Cigarettes, Vaping with nicotine    Smokeless tobacco: Never   Substance Use Topics    Alcohol use: No     Review of Systems   Constitutional:  Negative for chills, fatigue and fever.   Respiratory:  Negative for choking and shortness of breath.    Cardiovascular:  Negative for chest pain, palpitations and leg swelling.   Gastrointestinal:  Positive for blood in stool. Negative for abdominal distention, abdominal pain, diarrhea, rectal pain and vomiting.   Genitourinary:  Negative for flank pain and frequency.   Musculoskeletal:  Negative for back pain and myalgias.   Neurological:  Negative for dizziness and headaches.   Psychiatric/Behavioral:  Negative for agitation and confusion.        Physical Exam     Initial Vitals [03/17/24 1321]   BP Pulse Resp Temp SpO2   108/73 77 18 98.3 °F (36.8 °C) 100 %      MAP       --         Physical Exam    Nursing note and vitals reviewed.  Constitutional: She appears well-developed and well-nourished.   HENT:   Head: Normocephalic and atraumatic.   Right Ear: External ear normal.   Neck: Neck supple.   Normal range of motion.  Cardiovascular:  Normal rate, regular rhythm, normal heart sounds and intact distal pulses.           Pulmonary/Chest: Breath sounds normal.   Abdominal: Abdomen is soft. Bowel sounds are normal.   Genitourinary:    Genitourinary Comments: Rectal exam conducted in the presence of the patient's nurse, Lorelei Canales RN acting as a chaperone.    Normal rectal tone.    No overt bleeding or blood on MANUEL.    No anal fissure(s).      Musculoskeletal:         General: Normal range of motion.      Cervical back: Normal range of motion and neck supple.     Neurological: She is alert and oriented to person, place, and time. She has normal strength.   Skin: Skin is warm and dry. Capillary refill takes less than 2 seconds.   Psychiatric: She has a normal  mood and affect.         ED Course   Procedures  Labs Reviewed   CBC W/ AUTO DIFFERENTIAL   COMPREHENSIVE METABOLIC PANEL   OCCULT BLOOD X 1, STOOL   TYPE & SCREEN          Imaging Results    None          Medications - No data to display  Medical Decision Making             ED Course as of 03/17/24 1618   Sun Mar 17, 2024   1543 Group & Rh: O POS [LC]   1543 INDIRECT IRINA: NEG [LC]   1543 WBC: 4.78 [LC]   1543 Hemoglobin: 13.3 [LC]   1543 Hematocrit: 38.5 [LC]   1543 Sodium: 140 [LC]   1543 Creatinine: 1.0 [LC]   1543 Glucose: 73 [LC]   1602 Discussed case with the patient's GI physician (Dr. Campa) who did not feel pt required further emergent intervention. She requested I start the patient on Miralax as an OP. She will have the patient follow up with her next week.  [LC]      ED Course User Index  [LC] Roly Calderón MD                 Medical Decision Making:   Clinical Tests:   Lab Tests: Ordered and Reviewed  ED Management:  - I discussed the case and the patient presentation with the patient's gastroenterologist who happened to be on-call this weekend , , who is comfortable with my plan for discharge and follow up with her in her clinic later this week or next week; she did request that I start the patient on MiraLax as she thinks possible constipation may be contributing to her the patient's so-called black stools.  I discussed, at length the patient's ED workup results and the fact that there was no indication further emergent intervention or admission at this time.  The patient stated to me that she is comfortable with the plan for discharge in outpatient treatment with her gastroenterologist as indicated above.  Additionally, I have instructed the patient to start MiraLax per her GI physician's request.  Furthermore, the patient was given strict ED return precautions for any new or worsening symptoms.  The patient verbalized understanding of this and expressed a willingness to comply  with my recommendations.  She was hemodynamically stable throughout her ED stay.  Her hemoglobin hematocrit are within normal limits cared her rectal exam was unremarkable and her occult stool was negative.  - No further intervention is indicated at this time after having taken into account the patient's history, physical exam findings, and empirical and objective data obtained during the patient's emergency department workup.   - The patient is at low risk for an emergent medical condition at this time, and I am of the belief that that it is safe to discharge the patient from the emergency department.   - The patient is instructed to follow up as outpatient as indicated on the discharge paperwork.    - I have discussed the specifics of the workup with the patient and the patient has verbalized understanding of the details of the workup, the diagnosis, the treatment plan, and the need for outpatient follow-up.    - Although the patient has no emergent etiology today this does not preclude the development of an emergent condition so, in addition, I have advised the patient that they can return to the ED and/or activate EMS at any time with worsening of their symptoms, change of their symptoms, or with any other medical complaint.    - The patient remained comfortable and stable during their visit in the ED.    - Discharge and follow-up instructions discussed with the patient who expressed understanding and willingness to comply with my recommendations.  - Results of all emergency department tests  discussed thoroughly with patient; all patient questions answered; pt in agreement with plan  - Pt instructed to follow up with PCP in 2-3 days for recheck of today's complaints  - Pt given strict emergency department return precautions for any new or worsening of symptoms  - Pt discharged from the emergency department in stable condition, in no acute distress                Clinical Impression:  Final diagnoses:  [K92.1]  Black stool (Primary)          ED Disposition Condition    Discharge Stable          ED Prescriptions       Medication Sig Dispense Start Date End Date Auth. Provider    polyethylene glycol (GLYCOLAX) 17 gram/dose powder Take 17 g by mouth once daily. for 10 days 238 g 3/17/2024 3/27/2024 Roly Calderón MD          Follow-up Information       Follow up With Specialties Details Why Contact Info    Gisela Campa MD Gastroenterology Schedule an appointment as soon as possible for a visit   [address] [place], [place] [id number]  [phone]               Roly Calderón MD  03/17/24 8410

## 2024-03-22 ENCOUNTER — OFFICE VISIT (OUTPATIENT)
Dept: GASTROENTEROLOGY | Facility: CLINIC | Age: 34
End: 2024-03-22
Payer: MEDICAID

## 2024-03-22 ENCOUNTER — TELEPHONE (OUTPATIENT)
Dept: GASTROENTEROLOGY | Facility: CLINIC | Age: 34
End: 2024-03-22

## 2024-03-22 VITALS
HEART RATE: 70 BPM | SYSTOLIC BLOOD PRESSURE: 107 MMHG | DIASTOLIC BLOOD PRESSURE: 57 MMHG | BODY MASS INDEX: 17.02 KG/M2 | WEIGHT: 102.19 LBS | HEIGHT: 65 IN

## 2024-03-22 DIAGNOSIS — R63.4 UNINTENTIONAL WEIGHT LOSS: ICD-10-CM

## 2024-03-22 DIAGNOSIS — K31.84 GASTROPARESIS: Primary | ICD-10-CM

## 2024-03-22 PROCEDURE — 99999 PR PBB SHADOW E&M-EST. PATIENT-LVL III: CPT | Mod: PBBFAC,,, | Performed by: NURSE PRACTITIONER

## 2024-03-22 PROCEDURE — 3044F HG A1C LEVEL LT 7.0%: CPT | Mod: CPTII,,, | Performed by: NURSE PRACTITIONER

## 2024-03-22 PROCEDURE — 3078F DIAST BP <80 MM HG: CPT | Mod: CPTII,,, | Performed by: NURSE PRACTITIONER

## 2024-03-22 PROCEDURE — 99214 OFFICE O/P EST MOD 30 MIN: CPT | Mod: S$PBB,,, | Performed by: NURSE PRACTITIONER

## 2024-03-22 PROCEDURE — 3008F BODY MASS INDEX DOCD: CPT | Mod: CPTII,,, | Performed by: NURSE PRACTITIONER

## 2024-03-22 PROCEDURE — 99213 OFFICE O/P EST LOW 20 MIN: CPT | Mod: PBBFAC,PN | Performed by: NURSE PRACTITIONER

## 2024-03-22 PROCEDURE — 1160F RVW MEDS BY RX/DR IN RCRD: CPT | Mod: CPTII,,, | Performed by: NURSE PRACTITIONER

## 2024-03-22 PROCEDURE — 1159F MED LIST DOCD IN RCRD: CPT | Mod: CPTII,,, | Performed by: NURSE PRACTITIONER

## 2024-03-22 PROCEDURE — 3074F SYST BP LT 130 MM HG: CPT | Mod: CPTII,,, | Performed by: NURSE PRACTITIONER

## 2024-03-22 NOTE — Clinical Note
Please inform patient u/s to evaluate blood flow in the abdomen is schedule next week at [facility] location in [place].

## 2024-03-22 NOTE — PROGRESS NOTES
"Subjective:       Patient ID: Karine Rodriguez is a 33 y.o. female.    Chief Complaint: Follow-up and Abdominal Pain    34 y/o female with hx of lupus presents to clinic for follow up with c/o dark stools s/p EGD 3/15. Patient presented to ED two days after EGD with c/o of daily dark stools. Patient reports dark stool since upper endoscopy. EGD was normal with exception of mild gastritis. Picture of stool shown on cell phone. Stool is dark and formed; not consistent with melena. Denies hematochezia. Has 1 BM daily; does not feel constipated. Did not start Miralax as advised. Delayed gastric emptying noted on GES with 27 % retention at 4 hours (normal is < 10%). Reports ~20 lb weight loss over the last 6 months. Patient was asked to keep food diary. States she forgot to bring copy but states she eats "a lot" constantly eating and snacking throughout the day. Feels nauseous most of the time with intermittent upper abdominal pain. Worse after eating. Does not vomit often.         Past Medical History:   Diagnosis Date    Delayed gastric emptying     GERD (gastroesophageal reflux disease)     Liver fibrosis     Lupus (systemic lupus erythematosus)     Nerve pain     Social anxiety disorder     Weight loss        Past Surgical History:   Procedure Laterality Date     SECTION      x2    ESOPHAGOGASTRODUODENOSCOPY N/A 3/15/2024    Procedure: EGD (ESOPHAGOGASTRODUODENOSCOPY);  Surgeon: Gisela Campa MD;  Location: [facility];  Service: Endoscopy;  Laterality: N/A;    FRACTURE SURGERY Right     leg    LIVER BIOPSY      RENAL BIOPSY      WISDOM TOOTH EXTRACTION         History reviewed. No pertinent family history.    Social History     Socioeconomic History    Marital status:    Tobacco Use    Smoking status: Former     Types: Cigarettes, Vaping with nicotine    Smokeless tobacco: Never   Substance and Sexual Activity    Alcohol use: No    Sexual activity: Yes     Partners: Male       Review of Systems " "  Constitutional:  Positive for appetite change and unexpected weight change.   HENT:  Negative for trouble swallowing.    Respiratory:  Negative for cough and shortness of breath.    Cardiovascular:  Negative for chest pain.   Gastrointestinal:  Positive for abdominal pain, nausea and vomiting. Negative for blood in stool.   Neurological:  Negative for weakness.   Hematological:  Negative for adenopathy.   Psychiatric/Behavioral:  Negative for dysphoric mood.          Objective:     Vitals:    03/22/24 1139   BP: (!) 107/57   BP Location: Left arm   Patient Position: Sitting   BP Method: Medium (Automatic)   Pulse: 70   Weight: 46.3 kg (102 lb 2.9 oz)   Height: 5' 5" (1.651 m)          Physical Exam  Constitutional:       Appearance: She is underweight.   HENT:      Head:      Comments: Temporal wasting; sunken cheeks  Cardiovascular:      Rate and Rhythm: Normal rate and regular rhythm.   Pulmonary:      Effort: Pulmonary effort is normal. No respiratory distress.      Breath sounds: Normal breath sounds.   Musculoskeletal:         General: No swelling. Normal range of motion.      Cervical back: Normal range of motion.   Skin:     General: Skin is warm and dry.   Neurological:      Mental Status: She is alert and oriented to person, place, and time.   Psychiatric:         Mood and Affect: Mood normal.         Behavior: Behavior normal.               Assessment:         ICD-10-CM ICD-9-CM   1. Gastroparesis  K31.84 536.3   2. Unintentional weight loss  R63.4 783.21       Plan:       Gastroparesis  -     CV Ultrasound Mesenteric Arterial (Cupid Only); Future  -     Gastroparesis diet    Unintentional weight loss  -     US r/o celiac artery compression syndrome  -     CV Ultrasound Mesenteric Arterial (Cupid Only); Future  -     Recommend 2000 kcal/day diet. Protein supplement drink such as Ensure with each cristina. Keep food diary 3-4 days/week.  -     Follow up with dietician; concerns for possible eating " disorder      Follow up if symptoms worsen or fail to improve.     Patient's Medications   New Prescriptions    No medications on file   Previous Medications    AMITRIPTYLINE (ELAVIL) 10 MG TABLET    Take 10 mg by mouth every evening. Ran out last dose about 2 weeks ago    ASPIRIN (ECOTRIN) 81 MG EC TABLET    Take 1 tablet by mouth every evening.    ESCITALOPRAM OXALATE (LEXAPRO) 20 MG TABLET    Take 1 tablet (20 mg total) by mouth once daily.    HYDROXYCHLOROQUINE (PLAQUENIL) 200 MG TABLET    Take 300 mg by mouth every evening.    LOSARTAN (COZAAR) 25 MG TABLET    Take 25 mg by mouth every evening.    NORETHINDRONE (MICRONOR) 0.35 MG TABLET    Take 1 tablet (0.35 mg total) by mouth once daily.    PANTOPRAZOLE (PROTONIX) 40 MG TABLET    Take 1 tablet (40 mg total) by mouth once daily.    POLYETHYLENE GLYCOL (GLYCOLAX) 17 GRAM/DOSE POWDER    Take 17 g by mouth once daily. for 10 days   Modified Medications    No medications on file   Discontinued Medications    No medications on file

## 2024-03-22 NOTE — TELEPHONE ENCOUNTER
Pt informed of positive H pylori gastric biopsies results. Informed pt what H pylori is and informed pt this is treatable with antibiotics. Informed pt Dr Campa went ahead and sent 2 antibiotics to her pharmacy. Instructed for pt to buy OTC Bismarex. Instructed pt to take the pantoprazole twice daily for 14 days while on these antibiotics. Went over how to take all 4 medications as prescribed. Pt portal message sent so pt has this information. Pt voiced understanding and has our office number if she has any questions or concerns.

## 2024-03-27 ENCOUNTER — HOSPITAL ENCOUNTER (OUTPATIENT)
Dept: CARDIOLOGY | Facility: HOSPITAL | Age: 34
Discharge: HOME | End: 2024-03-27
Attending: NURSE PRACTITIONER
Payer: MEDICAID

## 2024-03-27 DIAGNOSIS — R63.4 UNINTENTIONAL WEIGHT LOSS: ICD-10-CM

## 2024-03-27 DIAGNOSIS — K31.84 GASTROPARESIS: ICD-10-CM

## 2024-03-27 LAB
AORTA PROX EDV: 29 CM/S
AORTA PROX PSV: 82 CM/S
CELIAC TRUNK EDV: 24 CM/S
CELIAC TRUNK PSV: 100 CM/S
COM HEPATIC EDV: 27 CM/S
COM HEPATIC PSV: 83 CM/S
DIST SMA EDV: 10 CM/S
DIST SMA PSV: 109 CM/S
IMA ORIGIN EDV: 10 CM/S
IMA ORIGIN PSV: 130 CM/S
MID IMA EDV: 10 CM/S
MID IMA PSV: 90 CM/S
MID SMA EDV: 20 CM/S
MID SMA PSV: 123 CM/S
PROX IMA EDV: 10 CM/S
PROX IMA PSV: 75 CM/S
PROX SMA EDV: 50 CM/S
PROX SMA PSV: 136 CM/S
SMA ORIGIN EDV: 23 CM/S
SMA ORIGIN PSV: 93 CM/S
SPLENIC EDV: 27 CM/S
SPLENIC PSV: 76 CM/S

## 2024-03-27 PROCEDURE — 93975 VASCULAR STUDY: CPT | Mod: 26,,, | Performed by: INTERNAL MEDICINE

## 2024-04-15 ENCOUNTER — PATIENT MESSAGE (OUTPATIENT)
Dept: GASTROENTEROLOGY | Facility: CLINIC | Age: 34
End: 2024-04-15
Payer: MEDICAID

## 2024-05-01 ENCOUNTER — OFFICE VISIT (OUTPATIENT)
Dept: FAMILY MEDICINE | Facility: HOSPITAL | Age: 34
End: 2024-05-01
Payer: MEDICAID

## 2024-05-01 VITALS
DIASTOLIC BLOOD PRESSURE: 54 MMHG | HEIGHT: 65 IN | SYSTOLIC BLOOD PRESSURE: 91 MMHG | WEIGHT: 112 LBS | HEART RATE: 78 BPM | BODY MASS INDEX: 18.66 KG/M2

## 2024-05-01 DIAGNOSIS — Z91.89 AT RISK FOR BREAST CANCER: ICD-10-CM

## 2024-05-01 DIAGNOSIS — M25.751 OSTEOPHYTE OF RIGHT HIP: ICD-10-CM

## 2024-05-01 DIAGNOSIS — M79.2 NEUROPATHIC PAIN: ICD-10-CM

## 2024-05-01 DIAGNOSIS — M25.551 PAIN OF RIGHT HIP: ICD-10-CM

## 2024-05-01 DIAGNOSIS — A04.8 H. PYLORI INFECTION: ICD-10-CM

## 2024-05-01 DIAGNOSIS — K31.84 GASTROPARESIS: ICD-10-CM

## 2024-05-01 DIAGNOSIS — M25.751 OSTEOPHYTE, RIGHT HIP: Primary | ICD-10-CM

## 2024-05-01 PROCEDURE — 99213 OFFICE O/P EST LOW 20 MIN: CPT

## 2024-05-01 RX ORDER — DICLOFENAC SODIUM 10 MG/G
2 GEL TOPICAL 4 TIMES DAILY
Qty: 100 G | Refills: 0 | Status: SHIPPED | OUTPATIENT
Start: 2024-05-01

## 2024-05-01 RX ORDER — AMITRIPTYLINE HYDROCHLORIDE 10 MG/1
10 TABLET, FILM COATED ORAL NIGHTLY
Qty: 90 TABLET | Refills: 1 | Status: SHIPPED | OUTPATIENT
Start: 2024-05-01

## 2024-05-01 NOTE — PROGRESS NOTES
"Progress Note  \[facility]\"" Family Medicine    Subjective:      Karine Rodriguez is a 33 y.o. female here for chronic right hip pain. Xray performed in Feb 2024 note degenerative rim-collar osteophyte at the right femoral head with relative maintained hip joint cartilage space. Pain refractory to tylenol, limited ability to use NSAIDs due to hx of lupus nephropathy. Endorses deep achy pain, with radiation to anterior, medial thigh. Previously refused PT.         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   [facility]      Hepatic fibrosis 03/21/2016          Review of Systems   All other systems reviewed and are negative.        Objective:   BP (!) 91/54   Pulse 78   Ht 5' 5" (1.651 m)   Wt 50.8 kg (111 lb 15.9 oz)   LMP 04/15/2024   BMI 18.64 kg/m²      Physical Exam  Vitals and nursing note reviewed.   Constitutional:       Appearance: Normal appearance.   Cardiovascular:      Rate and Rhythm: Normal rate and regular rhythm.      Pulses: Normal pulses.      Heart sounds: Normal heart sounds.   Pulmonary:      Effort: Pulmonary effort is normal.      Breath sounds: Normal breath sounds.   Musculoskeletal:      Comments: No TTP of right hip. Increased pain with internal rotation of hip   Neurological:      Mental Status: She is alert and oriented to person, place, and time.          Assessment:   33 y.o. with        1. Osteophyte, right hip    2. Pain of right hip    3. " Neuropathic pain    4. Osteophyte of right hip    5. At risk for breast cancer         Plan:   Karine was seen today for hip pain.    Diagnoses and all orders for this visit:    Pain of right hip  Osteophyte, right hip  - possibly hip impingement. Refractory to tylenol, hx of lupus nephropathy, proteinuria, discussed it is reasonable to use NSAID 2-3x per week, PT refused, refer to ortho to discuss further treatment options   -     Ambulatory referral/consult to Orthopedics; Future  -     diclofenac sodium (VOLTAREN) 1 % Gel; Apply 2 g topically 4 (four) times daily.    Neuropathic pain  - bilateral upper arm pain, unclear etiology. Controlled with elavil. Continue current regimen   -     amitriptyline (ELAVIL) 10 MG tablet; Take 1 tablet (10 mg total) by mouth every evening. Ran out last dose about 2 weeks ago      At risk for breast cancer        -    Family History of Breast Cancer Yes, describe: mother diagnosed with breast cancer at age 44, maternal aunt diagnosed with breast cancer at age 39.        -    Lifetime TC score of 31.57%        -    Genetics negative.         -    Follow with oncology, recent mammogram, MRI negative, recommend continued annual mammogram, MRI    Gastroparesis   H. Pylori infection        -    Undergoing treatment, is gaining weight, follows with GI         Follow up in 3 months for check up     Yang Vega DO  [facility] Family Medicine, PGY-2  5:04 PM, 05/01/2024

## 2024-06-17 ENCOUNTER — OFFICE VISIT (OUTPATIENT)
Dept: FAMILY MEDICINE | Facility: HOSPITAL | Age: 34
End: 2024-06-17
Payer: MEDICAID

## 2024-06-17 VITALS
WEIGHT: 108.44 LBS | HEIGHT: 65 IN | HEART RATE: 76 BPM | BODY MASS INDEX: 18.07 KG/M2 | SYSTOLIC BLOOD PRESSURE: 95 MMHG | DIASTOLIC BLOOD PRESSURE: 62 MMHG

## 2024-06-17 DIAGNOSIS — K52.9 GASTROENTERITIS: Primary | ICD-10-CM

## 2024-06-17 PROCEDURE — 99213 OFFICE O/P EST LOW 20 MIN: CPT

## 2024-06-17 NOTE — PROGRESS NOTES
[facility] Family Medicine    Subjective:     Karine Rodriguez is a 34 y.o. year old female with PMHx of Lupus, gastroparesis who presents to clinic for     Diarrhea:  3 days onset  Watery, nonbloody diarrhea, no sick contact or anyone with similar symptoms.   Also had nausea, heartburn, fatigue and vomitted (nonbloody) once yesterday  Had H. Pylori previously, states she completed the antibiotic course (14 days), but did not have test of cure  States she took anti-diarrheal medication and this helped her symptoms  She denies fevers at home  Able to tolerate liquids and smaller meals  Denies any recent medication changes    Patient Active Problem List    Diagnosis Date Noted    Osteophyte of right hip 2024    At risk for breast cancer 2024    Delayed gastric emptying 2024    Neuropathic pain 2023    Vitamin D deficiency 2023    Family history of breast cancer 2023    Lupus nephritis, ISN/RPS class II 2022    Long-term use of immunosuppressant medication 2021    SLE (systemic lupus erythematosus) 2019    Proteinuria 2018    Hair loss disorder 2016    Hepatic fibrosis 2016        Review of patient's allergies indicates:  No Known Allergies     Past Medical History:   Diagnosis Date    Delayed gastric emptying     GERD (gastroesophageal reflux disease)     Liver fibrosis     Lupus (systemic lupus erythematosus)     Nerve pain     Social anxiety disorder     Weight loss       Past Surgical History:   Procedure Laterality Date     SECTION      x2    ESOPHAGOGASTRODUODENOSCOPY N/A 3/15/2024    Procedure: EGD (ESOPHAGOGASTRODUODENOSCOPY);  Surgeon: Gisela Campa MD;  Location: [facility];  Service: Endoscopy;  Laterality: N/A;    FRACTURE SURGERY Right     leg    LIVER BIOPSY      RENAL BIOPSY      WISDOM TOOTH EXTRACTION        No family history on file.   Social History     Tobacco Use    Smoking status: Former     Types: Cigarettes, Vaping  "with nicotine    Smokeless tobacco: Never   Substance Use Topics    Alcohol use: No      Review of Systems   Constitutional:  Negative for chills and fever.   Respiratory:  Negative for shortness of breath.    Cardiovascular:  Negative for chest pain.   Gastrointestinal:  Positive for diarrhea, heartburn and nausea.     Objective:     Vitals:    06/17/24 1016   BP: 95/62   Pulse: 76   Weight: 49.2 kg (108 lb 7.5 oz)   Height: 5' 5" (1.651 m)     Physical Exam  Constitutional:       General: She is not in acute distress.     Appearance: She is not toxic-appearing.   HENT:      Head: Normocephalic.   Cardiovascular:      Pulses: Normal pulses.      Heart sounds: Normal heart sounds.   Pulmonary:      Effort: Pulmonary effort is normal.      Breath sounds: Normal breath sounds. No wheezing or rales.   Abdominal:      General: Bowel sounds are normal.      Tenderness: There is no right CVA tenderness, left CVA tenderness, guarding or rebound.      Comments: Tenderness to palpation of the epigastric region   Musculoskeletal:         General: Normal range of motion.   Skin:     General: Skin is warm and dry.   Neurological:      Mental Status: She is alert.   Psychiatric:         Mood and Affect: Mood normal.       Assessment/Plan:     Karine Rodriguez is a 34 y.o. year old female who presents to clinic for     1. Gastroenteritis  Given patient's symptoms of nausea with diarrhea, and fatigue and given the acuteness of her symptoms appears that patient may have acute viral gastroenteritis.  We will have patient follow up in 1 week to monitor symptoms and to order test of cure for H pylori    Follow-up:  1 week    ________________________  Jeremias Dias Jr, MD  [facility] Family Medicine PGY-1      This note was partially created using [employer] Voice Recognition software. Typographical and content errors may occur with this process. While efforts are made to detect and correct such errors, in some cases errors will persist. For " this reason, wording in this document should be considered in the proper context and not strictly verbatim.

## 2024-06-27 ENCOUNTER — OFFICE VISIT (OUTPATIENT)
Dept: ORTHOPEDICS | Facility: CLINIC | Age: 34
End: 2024-06-27
Payer: MEDICAID

## 2024-06-27 DIAGNOSIS — M25.551 PAIN OF RIGHT HIP: ICD-10-CM

## 2024-06-27 DIAGNOSIS — M25.751 OSTEOPHYTE, RIGHT HIP: ICD-10-CM

## 2024-06-27 PROCEDURE — 99213 OFFICE O/P EST LOW 20 MIN: CPT | Mod: PBBFAC,PN

## 2024-06-27 PROCEDURE — 1160F RVW MEDS BY RX/DR IN RCRD: CPT | Mod: CPTII,,,

## 2024-06-27 PROCEDURE — 1159F MED LIST DOCD IN RCRD: CPT | Mod: CPTII,,,

## 2024-06-27 PROCEDURE — 99204 OFFICE O/P NEW MOD 45 MIN: CPT | Mod: S$PBB,,,

## 2024-06-27 PROCEDURE — 3044F HG A1C LEVEL LT 7.0%: CPT | Mod: CPTII,,,

## 2024-06-27 PROCEDURE — 99999 PR PBB SHADOW E&M-EST. PATIENT-LVL III: CPT | Mod: PBBFAC,,,

## 2024-06-27 NOTE — PROGRESS NOTES
Subjective:      Patient ID: Karine Rodriguez is a 34 y.o. female.    Chief Complaint: No chief complaint on file.      HPI: Karine Rodriguez is a 34 y.o. female who presents to clinic for constant right hip pain. Patient denies known RUSS. The pain started at the end of , and is becoming progressively worse.  Pain is located over (points to) deep in the groin. She reports that the pain is a 6-7/10 sharp and stabbing pain today. Pain is 10/10 at its worst.  The pain is aggravated by sitting, certain maneuvers, walking, laying on the right hip.  Patient states some positions alleviate the pain. Denies weakness, numbness, tingling, radiation and inability to bear weight. The pain is affecting ADLs and limiting desired level of activity. Ambulation reportedly has been impaired. There is not a history of previous surgery to the hip.      Previous treatments include Tylenol, Ibuprofen, and Voltaren Gel which have provided adequate relief. Patient reports a couple of hours of relief with the Voltaren Gel.     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   History of DVT/PE: Negative    PAST MEDICAL HISTORY:    Past Medical History:   Diagnosis Date    Delayed gastric emptying     GERD (gastroesophageal reflux disease)     Liver fibrosis     Lupus (systemic lupus erythematosus)     Nerve pain     Social anxiety disorder     Weight loss      PAST SURGICAL HISTORY:    Past Surgical History:   Procedure Laterality Date     SECTION      x2    ESOPHAGOGASTRODUODENOSCOPY N/A 3/15/2024    Procedure: EGD (ESOPHAGOGASTRODUODENOSCOPY);  Surgeon: Gisela Campa MD;  Location: [facility];  Service: Endoscopy;  Laterality: N/A;    FRACTURE SURGERY Right     leg    LIVER BIOPSY      RENAL BIOPSY      WISDOM TOOTH EXTRACTION       FAMILY HISTORY:    No family history on file.    SOCIAL HISTORY:    Social History     Occupational History     Not on file   Tobacco Use    Smoking status: Former     Types: Cigarettes, Vaping with nicotine    Smokeless tobacco: Never   Substance and Sexual Activity    Alcohol use: No    Drug use: Not on file    Sexual activity: Yes     Partners: Male      MEDICATIONS:   Current Outpatient Medications:     amitriptyline (ELAVIL) 10 MG tablet, Take 1 tablet (10 mg total) by mouth every evening. Ran out last dose about 2 weeks ago, Disp: 90 tablet, Rfl: 1    amoxicillin (AMOXIL) 500 MG capsule, Amoxicillin 500mg caps, take 2 capsules 2 times per day for 14 days.  Take all 4 medications together (Patient not taking: Reported on 6/17/2024), Disp: 56 capsule, Rfl: 0    aspirin (ECOTRIN) 81 MG EC tablet, Take 1 tablet by mouth every evening., Disp: , Rfl:     diclofenac sodium (VOLTAREN) 1 % Gel, Apply 2 g topically 4 (four) times daily., Disp: 100 g, Rfl: 0    EScitalopram oxalate (LEXAPRO) 20 MG tablet, Take 1 tablet (20 mg total) by mouth once daily. (Patient taking differently: Take 20 mg by mouth every evening.), Disp: 90 tablet, Rfl: 1    hydroxychloroquine (PLAQUENIL) 200 mg tablet, Take 300 mg by mouth every evening., Disp: , Rfl:     losartan (COZAAR) 25 MG tablet, Take 25 mg by mouth every evening., Disp: , Rfl:     norethindrone (MICRONOR) 0.35 mg tablet, Take 1 tablet (0.35 mg total) by mouth once daily. (Patient taking differently: Take 1 tablet by mouth once daily. Not currently taking), Disp: 30 tablet, Rfl: 3    pantoprazole (PROTONIX) 40 MG tablet, Take 1 tablet (40 mg total) by mouth once daily. (Patient not taking: Reported on 3/22/2024), Disp: 30 tablet, Rfl: 11    ALLERGIES:   Review of patient's allergies indicates:  No Known Allergies    Review of Systems:  Constitution: Negative for chills, fever and night sweats.   HENT: Negative for congestion and headaches.    Eyes: Negative for blurred vision or vision loss.  Cardiovascular: Negative for chest pain and syncope.   Respiratory: Negative for cough and  shortness of breath.    Endocrine: Negative for polydipsia, polyphagia and polyuria.   Hematologic/Lymphatic: Negative for bleeding problem. Does not bruise/bleed easily.   Skin: Negative for dry skin, itching and rash.   Musculoskeletal: See HPI.   Gastrointestinal: Negative for abdominal pain and bowel incontinence.   Genitourinary: Negative for bladder incontinence and nocturia.   Neurological: Negative for disturbances in coordination, loss of balance and seizures.   Psychiatric/Behavioral: Negative for depression. The patient does not have insomnia.    Allergic/Immunologic: Negative for hives and persistent infections.        Objective:      There were no vitals filed for this visit.    PHYSICAL EXAM:  General: Alert & oriented x3, well-developed and well-nourished, in no acute distress, sitting comfortably in the exam room.  Skin: Warm and dry. Capillary refill less than 2 seconds.   Head: Normocephalic and atraumatic.   Eyes: Sclera appear normal.   Nose: No deformities seen.   Ears: No deformities seen.   Neck: No tracheal deviation present.   Pulmonary/Chest: Breathing unlabored.   Neurological: Alert and oriented to person, place, and time.   Psychiatric: Mood is pleasant and affect appropriate.     RIGHT HIP:        Body habitus is:  normal.         The patient walks without a limp.         Resisted SLR:  negative.        Sciatic tension findings:  negative.        The skin over the hip is intact.        Tenderness:  no local tenderness.        Range of Motion:    Flexion:  110°  External Rotation:  40°  Internal Rotation:  40°        Pain with rotation:  positive        Shortening/lengthening compared to the contralateral side:  absent.        Pulses DP present, PT present.        Motor:  normal 5/5 strength in all tested muscle groups.         Sensory:  normal.      Imaging:   X-Rays: 3 views of right hip dated 02/21/2024 , and independently reviewed, show: Degenerative changes of the right hip  including joint space narrowing and rim-collar osteophyte formation at the femoral head. No acute fractures or dislocations. No evidence of foreign bodies.         Assessment:       1. Osteophyte, right hip    2. Pain of right hip        Plan:       Orders Placed This Encounter    Ambulatory referral/consult  to [facility] Interventional RAD     I explained the nature of the problem to the patient.     I discussed at length with the patient all the different treatment options available for her right hip including anti-inflammatories, acetaminophen, rest, ice, heat, physical therapy, and corticosteroid injections. I explained the potential role of surgery in the treatment of this condition. The patient understands that if non-surgical measures do not adequately control symptoms, surgery will be considered in the future.     Medications:  Continue with OTC Tylenol and/or Ibuprofen as needed for pain management.   HEP:  65631 - Milagros Yadav PA-C instructed and demonstrated a hip ROM HEP. The patient then demonstrated understanding of exercises and proper technique. This program was performed for 15 minutes.   Procedures:  None today. We will refer the patient for an intra-articular corticosteroid injection.   Pain Management: Ice compress to the affected area 2-3x a day for 15-20 minutes as needed for pain management.  Referral: Referral to interventional radiology for right hip intra-articular injection.     Follow-Up: 3 months with Milagros Yadav PA-C for follow-up.     All of the patient's questions were answered and the patient will contact us if they have any questions or concerns in the interim.    Milagros Yadav PA-C  [facility]  Orthopedic Surgery    Medical Dictation software was used during the dictation of portions or the entirety of this medical record.  Phonetic or grammatic errors may exist due to the use of this software. For clarification, refer to the author of the document.

## 2024-06-28 ENCOUNTER — TELEPHONE (OUTPATIENT)
Dept: INTERVENTIONAL RADIOLOGY/VASCULAR | Facility: CLINIC | Age: 34
End: 2024-06-28
Payer: MEDICAID

## 2024-06-28 DIAGNOSIS — M79.2 NEUROPATHIC PAIN: Primary | ICD-10-CM

## 2024-06-28 NOTE — TELEPHONE ENCOUNTER
Left message for pt to return my call. Need to schedule IR procedure.  Please forward call to [phone]. Thanks

## 2024-06-28 NOTE — TELEPHONE ENCOUNTER
Spoke to pt on phone, Pt is scheduled on 7/19/2024 with arrival time of 1245pm for IR procedure at  location. Pt aware and confirmed, Thanks

## 2024-07-19 ENCOUNTER — HOSPITAL ENCOUNTER (OUTPATIENT)
Dept: INTERVENTIONAL RADIOLOGY/VASCULAR | Facility: HOSPITAL | Age: 34
Discharge: HOME | End: 2024-07-19
Attending: FAMILY MEDICINE
Payer: MEDICAID

## 2024-07-19 VITALS
SYSTOLIC BLOOD PRESSURE: 97 MMHG | RESPIRATION RATE: 15 BRPM | OXYGEN SATURATION: 100 % | DIASTOLIC BLOOD PRESSURE: 53 MMHG | HEART RATE: 56 BPM

## 2024-07-19 DIAGNOSIS — M79.2 NEUROPATHIC PAIN: ICD-10-CM

## 2024-07-19 LAB
B-HCG UR QL: NEGATIVE
CTP QC/QA: YES

## 2024-07-19 PROCEDURE — 81025 URINE PREGNANCY TEST: CPT

## 2024-07-19 PROCEDURE — 25000003 PHARM REV CODE 250: Performed by: STUDENT IN AN ORGANIZED HEALTH CARE EDUCATION/TRAINING PROGRAM

## 2024-07-19 PROCEDURE — 25500020 PHARM REV CODE 255: Performed by: FAMILY MEDICINE

## 2024-07-19 PROCEDURE — 63600175 PHARM REV CODE 636 W HCPCS: Mod: JZ,JG | Performed by: STUDENT IN AN ORGANIZED HEALTH CARE EDUCATION/TRAINING PROGRAM

## 2024-07-19 RX ORDER — METHYLPREDNISOLONE ACETATE 40 MG/ML
INJECTION, SUSPENSION INTRA-ARTICULAR; INTRALESIONAL; INTRAMUSCULAR; SOFT TISSUE
Status: COMPLETED | OUTPATIENT
Start: 2024-07-19 | End: 2024-07-19

## 2024-07-19 RX ORDER — BUPIVACAINE HYDROCHLORIDE 5 MG/ML
INJECTION, SOLUTION EPIDURAL; INTRACAUDAL
Status: COMPLETED | OUTPATIENT
Start: 2024-07-19 | End: 2024-07-19

## 2024-07-19 RX ORDER — LIDOCAINE HYDROCHLORIDE 10 MG/ML
INJECTION INFILTRATION; PERINEURAL
Status: COMPLETED | OUTPATIENT
Start: 2024-07-19 | End: 2024-07-19

## 2024-07-19 RX ORDER — TRIAMCINOLONE ACETONIDE 40 MG/ML
40 INJECTION, SUSPENSION INTRA-ARTICULAR; INTRAMUSCULAR ONCE
Status: DISCONTINUED | OUTPATIENT
Start: 2024-07-19 | End: 2024-07-20 | Stop reason: HOSPADM

## 2024-07-19 RX ADMIN — METHYLPREDNISOLONE ACETATE 80 MG: 40 INJECTION, SUSPENSION INTRA-ARTICULAR; INTRALESIONAL; INTRAMUSCULAR; SOFT TISSUE at 01:07

## 2024-07-19 RX ADMIN — BUPIVACAINE HYDROCHLORIDE 5 ML: 5 INJECTION, SOLUTION EPIDURAL; INTRACAUDAL; PERINEURAL at 01:07

## 2024-07-19 RX ADMIN — LIDOCAINE HYDROCHLORIDE 5 ML: 10 INJECTION, SOLUTION INFILTRATION; PERINEURAL at 01:07

## 2024-07-19 RX ADMIN — IOHEXOL 1 ML: 180 INJECTION INTRAVENOUS at 01:07

## 2024-07-19 NOTE — PLAN OF CARE
Rt hip injection completed, pt tolerated well. No apparent distress noted. VSS. Dressing applied CDI.  Discharge instructions reviewed and acknowledged by patient. Pt discharged via ambulation.

## 2024-07-19 NOTE — DISCHARGE INSTRUCTIONS
Questions or Concerns   - [phone] (MON-FRI 8 AM- 5PM).   - Radiology Resident on call [phone] (Weekends and After hours).  - IR Clinic [phone]

## 2024-07-19 NOTE — PLAN OF CARE
Pt arrived to IR for R hip injection, no acute distress noted. Orders and labs reviewed on chart. Awaiting consent.

## 2024-07-19 NOTE — PROCEDURES
"  Pre Op Diagnosis: right hip pain  Post Op Diagnosis: Same    Procedure: steroid injection    Procedure performed by: Ashok    Written Informed Consent Obtained: Yes  Specimen Removed: NO  Estimated Blood Loss: Minimal    Findings:   Successful steroid injection into right hip    Patient tolerated procedure well.    Camilo Pulido MD ([facility])  Interventional Radiology  [phone]      "

## 2024-07-19 NOTE — Clinical Note
Right: Hip.   Scrubbed with ChloroPrep With Tint.    Hair: N/A.  Skin prep dry before draping.  Prepped by: Camilo Pulido MD 7/19/2024 1:26 PM.

## 2024-07-19 NOTE — H&P
Radiology History & Physical      SUBJECTIVE:     Chief Complaint: R hip pain    History of Present Illness:  Karine Rodriguez is a 34 y.o. female who presents for R hip steroid injection.    Past Medical History:   Diagnosis Date    Delayed gastric emptying     GERD (gastroesophageal reflux disease)     Liver fibrosis     Lupus (systemic lupus erythematosus)     Nerve pain     Social anxiety disorder     Weight loss      Past Surgical History:   Procedure Laterality Date     SECTION      x2    ESOPHAGOGASTRODUODENOSCOPY N/A 3/15/2024    Procedure: EGD (ESOPHAGOGASTRODUODENOSCOPY);  Surgeon: Gisela Campa MD;  Location: [facility];  Service: Endoscopy;  Laterality: N/A;    FRACTURE SURGERY Right     leg    LIVER BIOPSY      RENAL BIOPSY      WISDOM TOOTH EXTRACTION         Home Meds:   Prior to Admission medications    Medication Sig Start Date End Date Taking? Authorizing Provider   amitriptyline (ELAVIL) 10 MG tablet Take 1 tablet (10 mg total) by mouth every evening. Ran out last dose about 2 weeks ago 24   Yang Vega,    amoxicillin (AMOXIL) 500 MG capsule Amoxicillin 500mg caps, take 2 capsules 2 times per day for 14 days.  Take all 4 medications together  Patient not taking: Reported on 2024 3/22/24   Gisela Campa MD   aspirin (ECOTRIN) 81 MG EC tablet Take 1 tablet by mouth every evening.    Provider, Historical   diclofenac sodium (VOLTAREN) 1 % Gel Apply 2 g topically 4 (four) times daily. 24   Yang Vega DO   EScitalopram oxalate (LEXAPRO) 20 MG tablet Take 1 tablet (20 mg total) by mouth once daily.  Patient taking differently: Take 20 mg by mouth every evening. 24   Yang Vega, DO   hydroxychloroquine (PLAQUENIL) 200 mg tablet Take 300 mg by mouth every evening. 20   Provider, Historical   losartan (COZAAR) 25 MG tablet Take 25 mg by mouth every evening.    Provider, Historical   norethindrone (MICRONOR) 0.35 mg tablet Take 1 tablet  (0.35 mg total) by mouth once daily.  Patient taking differently: Take 1 tablet by mouth once daily. Not currently taking 7/22/20 7/22/21  Coy Sutherland DO   pantoprazole (PROTONIX) 40 MG tablet Take 1 tablet (40 mg total) by mouth once daily.  Patient not taking: Reported on 3/22/2024 4/17/23 4/16/24  Carlos Pearson,      Anticoagulants/Antiplatelets: aspirin    Allergies: Review of patient's allergies indicates:  No Known Allergies  Sedation History:  no adverse reactions    Review of Systems:   Hematological: no known coagulopathies  Respiratory: no shortness of breath  Cardiovascular: no chest pain  Gastrointestinal: no abdominal pain  Genito-Urinary: no dysuria  Musculoskeletal: negative  Neurological: no TIA or stroke symptoms         OBJECTIVE:     Vital Signs (Most Recent)  Pulse: 75 (07/19/24 1317)  Resp: 16 (07/19/24 1317)  BP: 109/67 (07/19/24 1317)  SpO2: 99 % (07/19/24 1317)    Physical Exam:  ASA: 1  Mallampati: 2    General: no acute distress  Mental Status: alert and oriented to person, place and time  HEENT: normocephalic, atraumatic  Chest: unlabored breathing  Abdomen: nondistended  Extremity: moves all extremities    Laboratory  Lab Results   Component Value Date    INR 1.1 09/25/2022       Lab Results   Component Value Date    WBC 4.78 03/17/2024    HGB 13.3 03/17/2024    HCT 38.5 03/17/2024    MCV 84 03/17/2024     03/17/2024      Lab Results   Component Value Date    GLU 73 03/17/2024     03/17/2024    K 3.8 03/17/2024     03/17/2024    CO2 25 03/17/2024    BUN 17 03/17/2024    CREATININE 1.0 03/17/2024    CALCIUM 9.6 03/17/2024    ALT 14 03/17/2024    AST 16 03/17/2024    ALBUMIN 4.4 03/17/2024    BILITOT 0.6 03/17/2024       ASSESSMENT/PLAN:     Sedation Plan: local only.  Patient will undergo R hip steroid injection.    Rashard Cohen MD  Department of Radiology, PGY-3

## 2024-08-29 ENCOUNTER — HOSPITAL ENCOUNTER (EMERGENCY)
Facility: HOSPITAL | Age: 34
Discharge: HOME | End: 2024-08-29
Payer: MEDICAID

## 2024-08-29 VITALS
WEIGHT: 100 LBS | TEMPERATURE: 98 F | BODY MASS INDEX: 16.07 KG/M2 | DIASTOLIC BLOOD PRESSURE: 60 MMHG | RESPIRATION RATE: 16 BRPM | OXYGEN SATURATION: 100 % | HEIGHT: 66 IN | SYSTOLIC BLOOD PRESSURE: 122 MMHG | HEART RATE: 76 BPM

## 2024-08-29 PROCEDURE — 99900041 HC LEFT WITHOUT BEING SEEN- EMERGENCY

## 2025-02-04 DIAGNOSIS — R52 PAIN: Primary | ICD-10-CM

## 2025-02-05 ENCOUNTER — PATIENT MESSAGE (OUTPATIENT)
Dept: ORTHOPEDICS | Facility: CLINIC | Age: 35
End: 2025-02-05

## 2025-02-05 ENCOUNTER — OFFICE VISIT (OUTPATIENT)
Dept: ORTHOPEDICS | Facility: CLINIC | Age: 35
End: 2025-02-05
Payer: MEDICAID

## 2025-02-05 DIAGNOSIS — M25.751 OSTEOPHYTE, RIGHT HIP: ICD-10-CM

## 2025-02-05 DIAGNOSIS — M25.551 PAIN OF RIGHT HIP: Primary | ICD-10-CM

## 2025-02-05 PROCEDURE — 4010F ACE/ARB THERAPY RXD/TAKEN: CPT | Mod: CPTII,95,,

## 2025-02-05 PROCEDURE — 1160F RVW MEDS BY RX/DR IN RCRD: CPT | Mod: CPTII,95,,

## 2025-02-05 PROCEDURE — 98004 SYNCH AUDIO-VIDEO EST SF 10: CPT | Mod: 95,,,

## 2025-02-05 PROCEDURE — 1159F MED LIST DOCD IN RCRD: CPT | Mod: CPTII,95,,

## 2025-02-05 NOTE — PROGRESS NOTES
Audiovisual Telehealth Visit    The patient location is: Home  The chief complaint leading to consultation is: right hip pain  Visit type: Virtual visit with audiovisual  Total time spent in medical discussion with patient: 11 minutes  Total time spent on date of the encounter: 16 minutes       The reason for the audio only service rather than synchronous audio and video virtual visit was related to technical difficulties or patient preference/necessity.    Each patient to whom I provide medical services by telemedicine is:  (1) informed of the relationship between the physician and patient and the respective role of any other health care provider with respect to management of the patient; and (2) notified that they may decline to receive medical services by telemedicine and may withdraw from such care at any time. Patient verbally consented to receive this service via voice-only telephone call.      HPI: Karine Rodriguez is a 34 y.o. female who is following up for ongoing pain with her right hip. Patient was last seen by myself on 06/27/2024 for this issue.  Patient was previously referred for intra-articular hip injection, performed on 07/19/2024.  Patient reports symptoms returned approximately 1 month ago. She denies a history of trauma, but pain has been present since the end of 2023.  Pain is located deep in the groin.  The pain is aggravated by sitting, certain maneuvers, walking, laying on the right hip.  Patient states some positions alleviate the pain. Denies weakness, numbness, tingling, radiation and inability to bear weight. The pain is affecting ADLs and limiting desired level of activity. Ambulation reportedly has been impaired. Patient has recently been taking OTC Tylenol and NSAIDs without much relief. She reports good relief with previous right hip injection (last injection on 07/19/2024).  Today, I called the patient to discuss her symptoms and progress.       Assessment and Plan:    X-Rays: 3  views of right hip dated 02/21/2024, and independently reviewed, show: Degenerative changes of the right hip including joint space narrowing and rim-collar osteophyte formation at the femoral head. No acute fractures or dislocations. No evidence of foreign bodies.     1. Pain of right hip  Ambulatory referral/consult to Interventional RAD      2. Osteophyte, right hip  Ambulatory referral/consult to Interventional RAD          I explained the nature of the problem to the patient.     I discussed at length with the patient all the different treatment options available for her right hip including anti-inflammatories, acetaminophen, rest, ice, heat, physical therapy, and corticosteroid injections. I explained the potential role of surgery in the treatment of this condition. The patient understands that if non-surgical measures do not adequately control symptoms, surgery will be considered in the future.     Medications:  Continue with OTC Tylenol and/or Ibuprofen as needed for pain management.   HEP:  Continue with previously instructed and demonstrated hip ROM HEP.  Pain Management: Ice compress to the affected area 2-3x a day for 15-20 minutes as needed for pain management.  Referral: Referral to interventional radiology for right hip intra-articular injection.       Follow-Up:  3-4 months after injection for follow-up.     All of the patient's questions were answered and the patient will contact us if they have any questions or concerns in the interim.    Milagros Yadav PA-C  [facility]  Orthopedic Surgery    Medical Dictation software was used during the dictation of portions or the entirety of this medical record.  Phonetic or grammatic errors may exist due to the use of this software. For clarification, refer to the author of the document.     This service was not originating from a related E/M service provided within the previous 7 days nor will  to an E/M service or procedure within the next 24  hours or my soonest available appointment.  Prevailing standard of care was able to be met in this audio-only visit.

## 2025-02-06 DIAGNOSIS — R52 PAIN: Primary | ICD-10-CM

## 2025-02-07 DIAGNOSIS — M25.551 PAIN OF RIGHT HIP: ICD-10-CM

## 2025-02-07 DIAGNOSIS — M25.751 OSTEOPHYTE OF RIGHT HIP: Primary | ICD-10-CM

## 2025-02-25 ENCOUNTER — TELEPHONE (OUTPATIENT)
Dept: INTERVENTIONAL RADIOLOGY/VASCULAR | Facility: CLINIC | Age: 35
End: 2025-02-25
Payer: MEDICAID

## 2025-03-13 ENCOUNTER — HOSPITAL ENCOUNTER (OUTPATIENT)
Dept: INTERVENTIONAL RADIOLOGY/VASCULAR | Facility: HOSPITAL | Age: 35
Discharge: HOME | End: 2025-03-13
Payer: MEDICAID

## 2025-03-13 VITALS
RESPIRATION RATE: 16 BRPM | SYSTOLIC BLOOD PRESSURE: 109 MMHG | TEMPERATURE: 98 F | HEART RATE: 72 BPM | OXYGEN SATURATION: 100 % | DIASTOLIC BLOOD PRESSURE: 58 MMHG

## 2025-03-13 DIAGNOSIS — M25.551 RIGHT HIP PAIN: ICD-10-CM

## 2025-03-13 DIAGNOSIS — M25.551 PAIN OF RIGHT HIP: ICD-10-CM

## 2025-03-13 DIAGNOSIS — M25.751 OSTEOPHYTE OF RIGHT HIP: ICD-10-CM

## 2025-03-13 LAB
B-HCG UR QL: NEGATIVE
CTP QC/QA: YES

## 2025-03-13 PROCEDURE — 20610 DRAIN/INJ JOINT/BURSA W/O US: CPT | Mod: RT,,, | Performed by: STUDENT IN AN ORGANIZED HEALTH CARE EDUCATION/TRAINING PROGRAM

## 2025-03-13 PROCEDURE — 81025 URINE PREGNANCY TEST: CPT

## 2025-03-13 PROCEDURE — 63600175 PHARM REV CODE 636 W HCPCS: Performed by: STUDENT IN AN ORGANIZED HEALTH CARE EDUCATION/TRAINING PROGRAM

## 2025-03-13 PROCEDURE — 25500020 PHARM REV CODE 255: Performed by: STUDENT IN AN ORGANIZED HEALTH CARE EDUCATION/TRAINING PROGRAM

## 2025-03-13 PROCEDURE — 77002 NEEDLE LOCALIZATION BY XRAY: CPT | Mod: TC | Performed by: STUDENT IN AN ORGANIZED HEALTH CARE EDUCATION/TRAINING PROGRAM

## 2025-03-13 RX ORDER — BUPIVACAINE HYDROCHLORIDE 2.5 MG/ML
INJECTION, SOLUTION EPIDURAL; INFILTRATION; INTRACAUDAL; PERINEURAL
Status: COMPLETED | OUTPATIENT
Start: 2025-03-13 | End: 2025-03-13

## 2025-03-13 RX ORDER — LIDOCAINE HYDROCHLORIDE 20 MG/ML
INJECTION, SOLUTION INFILTRATION; PERINEURAL
Status: COMPLETED | OUTPATIENT
Start: 2025-03-13 | End: 2025-03-13

## 2025-03-13 RX ORDER — LIDOCAINE HYDROCHLORIDE 10 MG/ML
1 INJECTION, SOLUTION EPIDURAL; INFILTRATION; INTRACAUDAL; PERINEURAL ONCE AS NEEDED
Status: DISCONTINUED | OUTPATIENT
Start: 2025-03-13 | End: 2025-03-14 | Stop reason: HOSPADM

## 2025-03-13 RX ORDER — TRIAMCINOLONE ACETONIDE 40 MG/ML
INJECTION, SUSPENSION INTRA-ARTICULAR; INTRAMUSCULAR
Status: COMPLETED | OUTPATIENT
Start: 2025-03-13 | End: 2025-03-13

## 2025-03-13 RX ADMIN — LIDOCAINE HYDROCHLORIDE 1 ML: 20 INJECTION, SOLUTION INFILTRATION; PERINEURAL at 01:03

## 2025-03-13 RX ADMIN — TRIAMCINOLONE ACETONIDE 40 MG: 40 INJECTION, SUSPENSION INTRA-ARTICULAR; INTRAMUSCULAR at 01:03

## 2025-03-13 RX ADMIN — IOHEXOL 1 ML: 180 INJECTION INTRAVENOUS at 01:03

## 2025-03-13 RX ADMIN — BUPIVACAINE HYDROCHLORIDE 2 ML: 2.5 INJECTION, SOLUTION EPIDURAL; INFILTRATION; INTRACAUDAL; PERINEURAL at 01:03

## 2025-03-13 NOTE — DISCHARGE SUMMARY
Radiology Discharge Summary      Hospital Course: No complications    Admit Date: 3/13/2025  Discharge Date: 03/13/2025     Instructions Given to Patient: Yes  Diet: Resume prior diet  Activity: Activity as tolerated     Description of Condition on Discharge: Stable  Vital Signs (Most Recent): Temp: 98.6 °F (37 °C) (03/13/25 1305)  Pulse: 75 (03/13/25 1305)  Resp: 17 (03/13/25 1305)  BP: (!) 102/51 (03/13/25 1305)  SpO2: 100 % (03/13/25 1305)    Discharge Disposition: Home    Discharge Diagnosis: R hip pain     Follow-up: Follow-up with referring provider    Nancy Kraft MD

## 2025-03-13 NOTE — Clinical Note
Right: Hip.   Scrubbed with ChloroPrep With Tint.    Hair: N/A.  Skin prep dry before draping.  Prepped by: Nancy Kraft MD 3/13/2025 1:18 PM.

## 2025-03-13 NOTE — H&P
"H&P Note  Interventional Radiology    Reason for Consult: R hip steroid injection    SUBJECTIVE:     Chief Complaint: R hip pain    History of Present Illness: 34F PMHx SLE and R hip pain presenting for steroid injection.     Past Medical History:   Diagnosis Date    Delayed gastric emptying     GERD (gastroesophageal reflux disease)     Liver fibrosis     Lupus (systemic lupus erythematosus)     Nerve pain     Social anxiety disorder     Weight loss      Past Surgical History:   Procedure Laterality Date     SECTION      x2    ESOPHAGOGASTRODUODENOSCOPY N/A 3/15/2024    Procedure: EGD (ESOPHAGOGASTRODUODENOSCOPY);  Surgeon: Gisela Campa MD;  Location: [facility];  Service: Endoscopy;  Laterality: N/A;    FRACTURE SURGERY Right     leg    LIVER BIOPSY      RENAL BIOPSY      WISDOM TOOTH EXTRACTION       No family history on file.  Social History[1]    Review of Systems:  Constitutional/General:No fever, chills, change in appetite or weight loss.  Hematological/Immuno: no known coagulopathies  Respiratory: no shortness of breath  Cardiovascular: no chest pain  Gastrointestinal: no abdominal pain  Genito-Urinary: no dysuria  Musculoskeletal: +R hip pain  Skin: Negative for rash, itching, pigmentation changes, nail or hair changes.  Neurological: no TIA or stroke symptoms  Psychiatric: normal mood/affect, good insight/judgement      OBJECTIVE:     Vital Signs Range (Last 24H):  BP: ()/()   Arterial Line BP: ()/()     Physical Exam:  General- Patient AAO x3 in NAD  ENT- EOMI  Neck- No masses  CV- Normal rate  Resp-  No increased WOB  GI- Non-distended  Extrem- No cyanosis, clubbing, edema  Derm- No rashes, masses, or lesions noted  Neuro-  No focal deficits noted    Physical Exam  There is no height or weight on file to calculate BMI.    Scheduled Meds:   Continuous Infusions:   PRN Meds:    Allergies: Review of patient's allergies indicates:  No Known Allergies    Labs:  No results for input(s): "INR", " ""PT", "PTT" in the last 168 hours.  No results for input(s): "WBC", "HGB", "HCT", "MCV", "PLT" in the last 168 hours. No results for input(s): "GLU", "NA", "K", "CL", "CO2", "BUN", "CREATININE", "CALCIUM", "MG", "ALT", "AST", "ALBUMIN", "BILITOT", "BILIDIR" in the last 168 hours.    Vitals (Most Recent):       ASA: 2  Mallampati: N/A    Consent obtained.     ASSESSMENT/PLAN:     34F PMHx SLE with R hip pain  - R hip steroid injection with local anesthesia    There are no hospital problems to display for this patient.      Discussed how the procedure will be performed, risk/benefits, possible complications, pre-post procedure expectations, and alternatives. The patient voices understanding and all questions have been answered.  The patient agrees to proceed as planned.    Nancy Kraft MD         [1]   Social History  Tobacco Use    Smoking status: Former     Types: Cigarettes, Vaping with nicotine    Smokeless tobacco: Never   Substance Use Topics    Alcohol use: No     "

## 2025-03-13 NOTE — PLAN OF CARE
Discharge instructions given.  Bandaid to right hip.  No bloody drainage noted.  No overt deficits noted.  Discharged to waiting area- no sedation received

## 2025-03-13 NOTE — PROCEDURES
Interventional Radiology Post-Procedure Note    Pre Op Diagnosis: R hip pain  Post Op Diagnosis: Same    Procedure: R hip steroid injection    Procedure performed by: Nancy Kraft MD    Written Informed Consent Obtained: Yes  Specimen Removed: NO  Estimated Blood Loss: Minimal    Findings:   Successful R hip steroid injection. 2cc 0.25% bupivicaine + 1cc 40mg/ml Kenalog injected via 22ga spinal needle    Patient tolerated procedure well.      Nancy Kraft MD  Interventional Radiology

## 2025-03-13 NOTE — PLAN OF CARE
Pt in preop bay 30, VSS taken.  Pt denies any open wounds on body or the use of any weight loss injections. Pt ready to roll.    Procedural consents verified with pt.

## 2025-03-13 NOTE — DISCHARGE INSTRUCTIONS
Joint injection/ Aspiration Instructions    Follow-up Care   Follow-up care is an important part of your treatment and safety. Be sure to make and go to all follow-up appointments after your tube has been placed. You should expect to have follow-up care from both the provider who ordered the joint injection/ aspiration.     After the procedure  - You can expect some increased discomfort or sorenessa the site site for the first 24-26 hours, after which the symptoms should go back to baseline or improve     Call your doctor if you are having problems or increased pain, swelling, redness or oozing at the site.     Diet and Medication   1. Unless specified on your personalized discharge instructions, continue previous medications and/or diet recommendations.   2. For questions about this please contact your primary care provider or the provider who ordered the procedure    Activity    Avoid any strenuous activity for the day, after that you can proceed with activities as tolerated.    Bandage  - You may remove the bandage 24 hours after the procedure    Showering   You may shower the day of the procedure, but do not submerge the sire for 24 hours      If you have any of the following symptoms, call the Interventional Radiology Clinic at [phone], 8:00 am - 5:00 pm, Monday through Friday.     Increased pain at the site   Increased or more short of breath    Continuous vomiting    Fever (greater than 100.6 degrees F) or chills    Foul smelling drainage or abnormal bleeding from the access site       Call [phone] if you have any of the following signs and symptoms, or if you think you need emergency care.    Shortness of breath    Chest pain    Passing out, fainting (loss of consciousness)

## 2025-06-04 ENCOUNTER — OFFICE VISIT (OUTPATIENT)
Dept: ORTHOPEDICS | Facility: CLINIC | Age: 35
End: 2025-06-04
Payer: MEDICAID

## 2025-06-04 DIAGNOSIS — M25.751 OSTEOPHYTE, RIGHT HIP: Primary | ICD-10-CM

## 2025-06-04 DIAGNOSIS — M25.551 PAIN OF RIGHT HIP: ICD-10-CM

## 2025-06-04 PROCEDURE — 1160F RVW MEDS BY RX/DR IN RCRD: CPT | Mod: CPTII,95,,

## 2025-06-04 PROCEDURE — 4010F ACE/ARB THERAPY RXD/TAKEN: CPT | Mod: CPTII,95,,

## 2025-06-04 PROCEDURE — 98004 SYNCH AUDIO-VIDEO EST SF 10: CPT | Mod: 95,,,

## 2025-06-04 PROCEDURE — 1159F MED LIST DOCD IN RCRD: CPT | Mod: CPTII,95,,

## 2025-06-09 ENCOUNTER — HOSPITAL ENCOUNTER (EMERGENCY)
Facility: HOSPITAL | Age: 35
Discharge: HOME | End: 2025-06-09
Attending: EMERGENCY MEDICINE
Payer: MEDICAID

## 2025-06-09 VITALS
SYSTOLIC BLOOD PRESSURE: 114 MMHG | RESPIRATION RATE: 18 BRPM | HEART RATE: 88 BPM | BODY MASS INDEX: 20.09 KG/M2 | DIASTOLIC BLOOD PRESSURE: 62 MMHG | HEIGHT: 66 IN | TEMPERATURE: 98 F | OXYGEN SATURATION: 100 % | WEIGHT: 125 LBS

## 2025-06-09 DIAGNOSIS — K04.7 DENTAL ABSCESS: Primary | ICD-10-CM

## 2025-06-09 LAB
B-HCG UR QL: NEGATIVE
CTP QC/QA: YES

## 2025-06-09 PROCEDURE — 25000003 PHARM REV CODE 250: Performed by: EMERGENCY MEDICINE

## 2025-06-09 PROCEDURE — 81025 URINE PREGNANCY TEST: CPT | Performed by: EMERGENCY MEDICINE

## 2025-06-09 PROCEDURE — 96372 THER/PROPH/DIAG INJ SC/IM: CPT | Performed by: EMERGENCY MEDICINE

## 2025-06-09 PROCEDURE — 99284 EMERGENCY DEPT VISIT MOD MDM: CPT | Mod: 25

## 2025-06-09 PROCEDURE — 63600175 PHARM REV CODE 636 W HCPCS: Performed by: EMERGENCY MEDICINE

## 2025-06-09 RX ORDER — AMOXICILLIN AND CLAVULANATE POTASSIUM 875; 125 MG/1; MG/1
1 TABLET, FILM COATED ORAL
Status: COMPLETED | OUTPATIENT
Start: 2025-06-09 | End: 2025-06-09

## 2025-06-09 RX ORDER — KETOROLAC TROMETHAMINE 30 MG/ML
30 INJECTION, SOLUTION INTRAMUSCULAR; INTRAVENOUS
Status: COMPLETED | OUTPATIENT
Start: 2025-06-09 | End: 2025-06-09

## 2025-06-09 RX ORDER — PROCHLORPERAZINE EDISYLATE 5 MG/ML
10 INJECTION INTRAMUSCULAR; INTRAVENOUS
Status: COMPLETED | OUTPATIENT
Start: 2025-06-09 | End: 2025-06-09

## 2025-06-09 RX ORDER — AMOXICILLIN AND CLAVULANATE POTASSIUM 875; 125 MG/1; MG/1
1 TABLET, FILM COATED ORAL 2 TIMES DAILY
Qty: 14 TABLET | Refills: 0 | OUTPATIENT
Start: 2025-06-09

## 2025-06-09 RX ORDER — AMOXICILLIN AND CLAVULANATE POTASSIUM 875; 125 MG/1; MG/1
1 TABLET, FILM COATED ORAL 2 TIMES DAILY
Qty: 14 TABLET | Refills: 0 | OUTPATIENT
Start: 2025-06-09 | End: 2025-06-09

## 2025-06-09 RX ADMIN — KETOROLAC TROMETHAMINE 30 MG: 30 INJECTION, SOLUTION INTRAMUSCULAR; INTRAVENOUS at 08:06

## 2025-06-09 RX ADMIN — AMOXICILLIN AND CLAVULANATE POTASSIUM 1 TABLET: 875; 125 TABLET, FILM COATED ORAL at 08:06

## 2025-06-09 RX ADMIN — PROCHLORPERAZINE EDISYLATE 10 MG: 5 INJECTION INTRAMUSCULAR; INTRAVENOUS at 08:06

## 2025-06-10 NOTE — DISCHARGE INSTRUCTIONS
I recommend taking Tylenol 650 mg every 6 hours as needed for pain.  Please follow-up with your primary care physician and dentist for further evaluation and management.  Please return with any new or worsening symptoms.

## 2025-06-10 NOTE — ED PROVIDER NOTES
Encounter Date: 2025       History     Chief Complaint   Patient presents with    Otalgia     Patient reports left upper dental and gum pain with swelling that started yesterday. Denies fever.      34-year-old female presents to the emergency department complaining left upper dental pain radiating towards her left cheek.  Onset yesterday.  States she has a crown in the area and it feels little swollen to those teeth.  Denies any cough, congestion, fever, nausea, vomiting.  Is scheduled for a kidney biopsy next week so she did not taken any medication today for the symptoms.  No other symptoms reported at this time.  Denies any fever.  Of note, triage note indicates ear pain but patient is actually complaining of dental pain.      Review of patient's allergies indicates:  No Known Allergies  Past Medical History:   Diagnosis Date    Delayed gastric emptying     GERD (gastroesophageal reflux disease)     Liver fibrosis     Lupus (systemic lupus erythematosus)     Nerve pain     Social anxiety disorder     Weight loss      Past Surgical History:   Procedure Laterality Date     SECTION      x2    ESOPHAGOGASTRODUODENOSCOPY N/A 3/15/2024    Procedure: EGD (ESOPHAGOGASTRODUODENOSCOPY);  Surgeon: Gisela Campa MD;  Location: [facility];  Service: Endoscopy;  Laterality: N/A;    FRACTURE SURGERY Right     leg    LIVER BIOPSY      RENAL BIOPSY      WISDOM TOOTH EXTRACTION       No family history on file.  Social History[1]  Review of Systems   Constitutional:  Negative for chills and fever.   HENT:  Negative for congestion.    Respiratory:  Negative for cough and shortness of breath.    Cardiovascular:  Negative for chest pain.   Gastrointestinal:  Negative for abdominal pain.   Musculoskeletal:  Negative for back pain.   Neurological:  Negative for headaches.       Physical Exam     Initial Vitals [25 1920]   BP Pulse Resp Temp SpO2   114/62 88 18 97.9 °F (36.6 °C) 100 %      MAP       --          Physical Exam    Nursing note and vitals reviewed.  Constitutional: She appears well-developed and well-nourished. No distress.   HENT:   Head: Normocephalic and atraumatic. Mouth/Throat:       Eyes: Conjunctivae and EOM are normal. Pupils are equal, round, and reactive to light.   Neck: Neck supple. No tracheal deviation present.   Normal range of motion.  Cardiovascular:  Normal rate and intact distal pulses.           Pulmonary/Chest: No respiratory distress.   Musculoskeletal:         General: No tenderness or edema. Normal range of motion.      Cervical back: Normal range of motion and neck supple.     Neurological: She is alert and oriented to person, place, and time. She has normal strength. No cranial nerve deficit. GCS score is 15. GCS eye subscore is 4. GCS verbal subscore is 5. GCS motor subscore is 6.   Skin: Skin is warm and dry.         ED Course   Procedures  Labs Reviewed   POCT URINE PREGNANCY       Result Value    POC Preg Test, Ur Negative       Acceptable Yes            Imaging Results    None          Medications   ketorolac injection 30 mg (30 mg Intramuscular Given 6/9/25 2038)   prochlorperazine injection Soln 10 mg (10 mg Intramuscular Given 6/9/25 2038)   amoxicillin-clavulanate 875-125mg per tablet 1 tablet (1 tablet Oral Given 6/9/25 2038)     Medical Decision Making  34-year-old female presents emergency department complaining of dental pain      Differential: Dentalgia, dental abscess, impacted tooth, dental caries      Patient given IM Toradol and Compazine.  Informed of results and plan to discharge with prescription for Augmentin, instructions on home management, over-the-counter medications, follow up with primary care physician and/or dentist, strict return precautions given.  Vital signs stable, patient comfortable with discharge at this time.    Problems Addressed:  Dental abscess: acute illness or injury    Amount and/or Complexity of Data Reviewed  External  Data Reviewed: notes.     Details: Reviewed most recent orthopedic note documenting baseline medications and past medical history  Labs: ordered.     Details: UPT negative    Risk  OTC drugs.  Prescription drug management.  Parenteral controlled substances.                                      Clinical Impression:  Final diagnoses:  [K04.7] Dental abscess (Primary)          ED Disposition Condition    Discharge Stable          ED Prescriptions       Medication Sig Dispense Start Date End Date Auth. Provider    amoxicillin-clavulanate 875-125mg (AUGMENTIN) 875-125 mg per tablet  (Status: Discontinued) Take 1 tablet by mouth 2 (two) times daily. 14 tablet 6/9/2025 6/9/2025 Nikolas Garza MD    amoxicillin-clavulanate 875-125mg (AUGMENTIN) 875-125 mg per tablet  (Status: Discontinued) Take 1 tablet by mouth 2 (two) times daily. 14 tablet 6/9/2025 6/9/2025 Nikolas Garza MD    amoxicillin-clavulanate 875-125mg (AUGMENTIN) 875-125 mg per tablet  (Status: Discontinued) Take 1 tablet by mouth 2 (two) times daily. 14 tablet 6/9/2025 6/9/2025 Nikolas Garza MD    amoxicillin-clavulanate 875-125mg (AUGMENTIN) 875-125 mg per tablet  (Status: Discontinued) Take 1 tablet by mouth 2 (two) times daily. 14 tablet 6/9/2025 6/9/2025 Nikolas Garza MD    amoxicillin-clavulanate 875-125mg (AUGMENTIN) 875-125 mg per tablet  (Status: Discontinued) Take 1 tablet by mouth 2 (two) times daily. 14 tablet 6/9/2025 6/9/2025 Nikolas Garza MD    amoxicillin-clavulanate 875-125mg (AUGMENTIN) 875-125 mg per tablet  (Status: Discontinued) Take 1 tablet by mouth 2 (two) times daily. 14 tablet 6/9/2025 6/9/2025 Nikolas Garza MD    amoxicillin-clavulanate 875-125mg (AUGMENTIN) 875-125 mg per tablet Take 1 tablet by mouth 2 (two) times daily. 14 tablet 6/9/2025 -- Nikolas Garza MD          Follow-up Information       Follow up With Specialties Details Why Contact Info    Yang Vega,  Family Medicine  Schedule an appointment as soon as possible for a visit   [address], [place] [address]  [place] [address] [phone]                     [1]   Social History  Tobacco Use    Smoking status: Former     Types: Cigarettes, Vaping with nicotine    Smokeless tobacco: Never   Substance Use Topics    Alcohol use: No        Nikolas Garza MD  06/09/25 [id number]

## 2025-06-10 NOTE — ED NOTES
Pt reports left sided maxilla pain x1 day. Pt states she was seen by [facility] and diagnosed with a bone infection approximately a year ago but did not receive any treatment.